# Patient Record
Sex: FEMALE | Race: WHITE | Employment: FULL TIME | URBAN - METROPOLITAN AREA
[De-identification: names, ages, dates, MRNs, and addresses within clinical notes are randomized per-mention and may not be internally consistent; named-entity substitution may affect disease eponyms.]

---

## 2019-02-19 ENCOUNTER — HOSPITAL ENCOUNTER (OUTPATIENT)
Dept: GENERAL RADIOLOGY | Age: 44
Discharge: HOME | End: 2019-02-21
Payer: COMMERCIAL

## 2019-02-19 DIAGNOSIS — Z12.31 VISIT FOR SCREENING MAMMOGRAM: ICD-10-CM

## 2019-02-19 PROCEDURE — 77067 SCR MAMMO BI INCL CAD: CPT

## 2019-03-15 ENCOUNTER — OFFICE VISIT (OUTPATIENT)
Dept: FAMILY MEDICINE CLINIC | Age: 44
End: 2019-03-15
Payer: COMMERCIAL

## 2019-03-15 VITALS
HEART RATE: 75 BPM | HEIGHT: 68 IN | SYSTOLIC BLOOD PRESSURE: 124 MMHG | DIASTOLIC BLOOD PRESSURE: 80 MMHG | OXYGEN SATURATION: 96 % | WEIGHT: 139 LBS | BODY MASS INDEX: 21.07 KG/M2 | RESPIRATION RATE: 16 BRPM

## 2019-03-15 DIAGNOSIS — M25.561 CHRONIC PAIN OF BOTH KNEES: ICD-10-CM

## 2019-03-15 DIAGNOSIS — Z00.00 WELL ADULT EXAM: Primary | ICD-10-CM

## 2019-03-15 DIAGNOSIS — J30.81 CAT ALLERGIES: ICD-10-CM

## 2019-03-15 DIAGNOSIS — M25.562 CHRONIC PAIN OF BOTH KNEES: ICD-10-CM

## 2019-03-15 DIAGNOSIS — G89.29 CHRONIC PAIN OF BOTH KNEES: ICD-10-CM

## 2019-03-15 PROCEDURE — G8484 FLU IMMUNIZE NO ADMIN: HCPCS | Performed by: FAMILY MEDICINE

## 2019-03-15 PROCEDURE — 99396 PREV VISIT EST AGE 40-64: CPT | Performed by: FAMILY MEDICINE

## 2019-03-15 RX ORDER — ALBUTEROL SULFATE 90 UG/1
2 AEROSOL, METERED RESPIRATORY (INHALATION) EVERY 4 HOURS PRN
Qty: 1 INHALER | Refills: 5 | Status: SHIPPED
Start: 2019-03-15 | End: 2022-03-29 | Stop reason: SDUPTHER

## 2019-05-16 NOTE — PROGRESS NOTES
Multiple attempts to schedule patient for breast MRI. Voice mails left for patient, no return calls. Physician office notified.

## 2019-06-20 ENCOUNTER — NURSE ONLY (OUTPATIENT)
Dept: FAMILY MEDICINE CLINIC | Age: 44
End: 2019-06-20
Payer: COMMERCIAL

## 2019-06-20 ENCOUNTER — HOSPITAL ENCOUNTER (OUTPATIENT)
Age: 44
Discharge: HOME | End: 2019-06-22
Payer: COMMERCIAL

## 2019-06-20 DIAGNOSIS — R31.9 HEMATURIA, UNSPECIFIED TYPE: ICD-10-CM

## 2019-06-20 DIAGNOSIS — R30.0 BURNING WITH URINATION: ICD-10-CM

## 2019-06-20 DIAGNOSIS — R30.0 BURNING WITH URINATION: Primary | ICD-10-CM

## 2019-06-20 DIAGNOSIS — R35.0 FREQUENCY OF URINATION: ICD-10-CM

## 2019-06-20 LAB
BILIRUBIN, POC: NORMAL
BLOOD URINE, POC: NORMAL
CLARITY, POC: NORMAL
COLOR, POC: NORMAL
GLUCOSE URINE, POC: NORMAL
KETONES, POC: NORMAL
LEUKOCYTE EST, POC: NORMAL
NITRITE, POC: NORMAL
PH, POC: 7.5
PROTEIN, POC: NORMAL
SPECIFIC GRAVITY, POC: 1.02
UROBILINOGEN, POC: NORMAL

## 2019-06-20 PROCEDURE — 87186 SC STD MICRODIL/AGAR DIL: CPT

## 2019-06-20 PROCEDURE — 81002 URINALYSIS NONAUTO W/O SCOPE: CPT | Performed by: FAMILY MEDICINE

## 2019-06-20 PROCEDURE — 87088 URINE BACTERIA CULTURE: CPT

## 2019-06-20 PROCEDURE — 87077 CULTURE AEROBIC IDENTIFY: CPT

## 2019-06-20 RX ORDER — CIPROFLOXACIN 500 MG/1
500 TABLET, FILM COATED ORAL 2 TIMES DAILY
Qty: 10 TABLET | Refills: 0 | Status: SHIPPED | OUTPATIENT
Start: 2019-06-20 | End: 2019-06-25

## 2019-06-22 LAB
ORGANISM: ABNORMAL
URINE CULTURE, ROUTINE: ABNORMAL
URINE CULTURE, ROUTINE: ABNORMAL

## 2019-10-11 ENCOUNTER — NURSE ONLY (OUTPATIENT)
Dept: FAMILY MEDICINE CLINIC | Age: 44
End: 2019-10-11

## 2019-10-11 ENCOUNTER — HOSPITAL ENCOUNTER (OUTPATIENT)
Age: 44
Discharge: HOME | End: 2019-10-13
Payer: COMMERCIAL

## 2019-10-11 DIAGNOSIS — R30.0 DYSURIA: Primary | ICD-10-CM

## 2019-10-11 DIAGNOSIS — R30.0 DYSURIA: ICD-10-CM

## 2019-10-11 LAB
BILIRUBIN, POC: NORMAL
BLOOD URINE, POC: NORMAL
CLARITY, POC: NORMAL
COLOR, POC: NORMAL
GLUCOSE URINE, POC: NORMAL
KETONES, POC: NORMAL
LEUKOCYTE EST, POC: NORMAL
NITRITE, POC: NORMAL
PH, POC: 6
PROTEIN, POC: NORMAL
SPECIFIC GRAVITY, POC: 1.02
UROBILINOGEN, POC: NORMAL

## 2019-10-11 PROCEDURE — 87088 URINE BACTERIA CULTURE: CPT

## 2019-10-11 PROCEDURE — 81002 URINALYSIS NONAUTO W/O SCOPE: CPT | Performed by: FAMILY MEDICINE

## 2019-10-11 RX ORDER — SULFAMETHOXAZOLE AND TRIMETHOPRIM 800; 160 MG/1; MG/1
1 TABLET ORAL 2 TIMES DAILY
Qty: 10 TABLET | Refills: 0 | Status: SHIPPED | OUTPATIENT
Start: 2019-10-11 | End: 2019-10-16

## 2019-10-14 LAB
ORGANISM: ABNORMAL
URINE CULTURE, ROUTINE: ABNORMAL
URINE CULTURE, ROUTINE: ABNORMAL

## 2019-12-02 LAB
ALBUMIN SERPL-MCNC: NORMAL G/DL
ALP BLD-CCNC: NORMAL U/L
ALT SERPL-CCNC: NORMAL U/L
ANION GAP SERPL CALCULATED.3IONS-SCNC: NORMAL MMOL/L
AST SERPL-CCNC: NORMAL U/L
BILIRUB SERPL-MCNC: NORMAL MG/DL
BUN BLDV-MCNC: 13 MG/DL
CALCIUM SERPL-MCNC: NORMAL MG/DL
CHLORIDE BLD-SCNC: NORMAL MMOL/L
CHOLESTEROL, TOTAL: 196 MG/DL
CHOLESTEROL/HDL RATIO: NORMAL
CO2: NORMAL
CREAT SERPL-MCNC: 0.72 MG/DL
GFR CALCULATED: NORMAL
GLUCOSE BLD-MCNC: 90 MG/DL
HDLC SERPL-MCNC: 69 MG/DL (ref 35–70)
LDL CHOLESTEROL CALCULATED: 112 MG/DL (ref 0–160)
POTASSIUM SERPL-SCNC: 4.5 MMOL/L
SODIUM BLD-SCNC: NORMAL MMOL/L
TOTAL PROTEIN: NORMAL
TRIGL SERPL-MCNC: 76 MG/DL
VLDLC SERPL CALC-MCNC: NORMAL MG/DL

## 2019-12-06 DIAGNOSIS — Z00.00 WELL ADULT EXAM: ICD-10-CM

## 2020-02-27 ENCOUNTER — HOSPITAL ENCOUNTER (OUTPATIENT)
Dept: GENERAL RADIOLOGY | Age: 45
Discharge: HOME | End: 2020-02-29
Payer: COMMERCIAL

## 2020-02-27 PROCEDURE — 77063 BREAST TOMOSYNTHESIS BI: CPT

## 2021-03-05 ENCOUNTER — HOSPITAL ENCOUNTER (OUTPATIENT)
Dept: GENERAL RADIOLOGY | Age: 46
Discharge: HOME | End: 2021-03-07
Payer: COMMERCIAL

## 2021-03-05 DIAGNOSIS — Z12.31 VISIT FOR SCREENING MAMMOGRAM: ICD-10-CM

## 2021-03-05 PROCEDURE — 77063 BREAST TOMOSYNTHESIS BI: CPT

## 2021-03-24 ENCOUNTER — OFFICE VISIT (OUTPATIENT)
Dept: FAMILY MEDICINE CLINIC | Age: 46
End: 2021-03-24
Payer: COMMERCIAL

## 2021-03-24 VITALS
SYSTOLIC BLOOD PRESSURE: 102 MMHG | HEIGHT: 68 IN | OXYGEN SATURATION: 99 % | BODY MASS INDEX: 22.34 KG/M2 | RESPIRATION RATE: 16 BRPM | DIASTOLIC BLOOD PRESSURE: 64 MMHG | WEIGHT: 147.4 LBS | HEART RATE: 78 BPM | TEMPERATURE: 98.4 F

## 2021-03-24 DIAGNOSIS — Z76.89 ENCOUNTER TO ESTABLISH CARE: ICD-10-CM

## 2021-03-24 DIAGNOSIS — E04.1 THYROID NODULE: ICD-10-CM

## 2021-03-24 DIAGNOSIS — Z23 NEED FOR VACCINATION: ICD-10-CM

## 2021-03-24 DIAGNOSIS — J45.20 MILD INTERMITTENT ASTHMA WITHOUT COMPLICATION: Primary | ICD-10-CM

## 2021-03-24 PROCEDURE — G8420 CALC BMI NORM PARAMETERS: HCPCS | Performed by: FAMILY MEDICINE

## 2021-03-24 PROCEDURE — G8427 DOCREV CUR MEDS BY ELIG CLIN: HCPCS | Performed by: FAMILY MEDICINE

## 2021-03-24 PROCEDURE — G8484 FLU IMMUNIZE NO ADMIN: HCPCS | Performed by: FAMILY MEDICINE

## 2021-03-24 PROCEDURE — 1036F TOBACCO NON-USER: CPT | Performed by: FAMILY MEDICINE

## 2021-03-24 PROCEDURE — 90715 TDAP VACCINE 7 YRS/> IM: CPT | Performed by: FAMILY MEDICINE

## 2021-03-24 PROCEDURE — 90471 IMMUNIZATION ADMIN: CPT | Performed by: FAMILY MEDICINE

## 2021-03-24 PROCEDURE — 99213 OFFICE O/P EST LOW 20 MIN: CPT | Performed by: FAMILY MEDICINE

## 2021-03-24 SDOH — ECONOMIC STABILITY: TRANSPORTATION INSECURITY
IN THE PAST 12 MONTHS, HAS THE LACK OF TRANSPORTATION KEPT YOU FROM MEDICAL APPOINTMENTS OR FROM GETTING MEDICATIONS?: NO

## 2021-03-24 SDOH — ECONOMIC STABILITY: INCOME INSECURITY: HOW HARD IS IT FOR YOU TO PAY FOR THE VERY BASICS LIKE FOOD, HOUSING, MEDICAL CARE, AND HEATING?: NOT HARD AT ALL

## 2021-03-24 ASSESSMENT — PATIENT HEALTH QUESTIONNAIRE - PHQ9
SUM OF ALL RESPONSES TO PHQ QUESTIONS 1-9: 0
SUM OF ALL RESPONSES TO PHQ QUESTIONS 1-9: 0

## 2021-03-24 NOTE — PROGRESS NOTES
CC: Griselda Churchman is a 51 y.o. yo female here for evaluation of the following medical concerns: New Patient      HPI:    Establish care    Mild intermittent asthma - exacerbated by animal dander; uses albuterol PRN  Benign thryoid nodule in the past - US done at [facility]; no records on this    ROS negative unless otherwise noted    Vitals:  /64   Pulse 78   Temp 98.4 °F (36.9 °C)   Resp 16   Ht 5' 8\" (1.727 m)   Wt 147 lb 6.4 oz (66.9 kg)   SpO2 99%   BMI 22.41 kg/m²   Wt Readings from Last 3 Encounters:   03/24/21 147 lb 6.4 oz (66.9 kg)   03/15/19 139 lb (63 kg)   05/01/15 134 lb 3.2 oz (60.9 kg)       Physical Exam:  Constitutional - alert, well appearing, and in no distress  Eyes - extraocular eye movements intact, left eye normal, right eye normal, no conjunctivitis noted  Neck - supple, no significant adenopathy; thyroid exam: thyroid is normal in size without nodules or tenderness  Respiratory- clear to auscultation, no wheezes, rales or rhonchi, symmetric air entry; no increased work of breathing  Cardiovascular - normal rate, regular rhythm, normal S1, S2, no murmurs, rubs, clicks or gallops; Extremities - no edema noted  Abdomen - soft, nontender, nondistended  Musculoskeletal - gait normal; no clubbing, cyanosis of extremities noted; no joint deformity or swelling noted  Skin - normal coloration and turgor, no rashes, no suspicious skin lesions noted  Neurological - alert, oriented; no obvious CN deficits, normal speech, no obvious focal findings noted  Psychiatric - normal mood, behavior, speech, dress    Assessment / Plan   Diagnosis Orders   1. Mild intermittent asthma without complication     2. Encounter to establish care     3. Thyroid nodule     4. Need for vaccination  Tdap (age 6y and older) IM (Aluwave Extension)       She will call for labs in 1 year prior to our visit  Medical records as able    RTO: Return in about 1 year (around 3/24/2022) for Annual Preventive.       An electronic signature was used to authenticate this note.  ---- Rolan Hilliard MD on 3/24/2021 at 3:19 PM

## 2022-03-15 ENCOUNTER — TELEPHONE (OUTPATIENT)
Dept: FAMILY MEDICINE CLINIC | Age: 47
End: 2022-03-15

## 2022-03-15 DIAGNOSIS — E55.9 VITAMIN D DEFICIENCY, UNSPECIFIED: ICD-10-CM

## 2022-03-15 DIAGNOSIS — Z00.00 HEALTHCARE MAINTENANCE: Primary | ICD-10-CM

## 2022-03-15 DIAGNOSIS — E78.5 HYPERLIPIDEMIA, UNSPECIFIED HYPERLIPIDEMIA TYPE: ICD-10-CM

## 2022-03-15 DIAGNOSIS — Z00.00 ENCOUNTER FOR PREVENTIVE CARE: ICD-10-CM

## 2022-03-15 NOTE — TELEPHONE ENCOUNTER
----- Message from Patricia Mcgill sent at 3/14/2022  4:47 PM EDT -----  Subject: Message to Provider    QUESTIONS  Information for Provider? The patient called in to order lab work for an   upcoming appointment there are none on file so she is requesting a call   back on how to proceed if still needed. ---------------------------------------------------------------------------  --------------  Giuliana PICHARDO  What is the best way for the office to contact you? OK to leave message on   voicemail  Preferred Call Back Phone Number? [phone]  ---------------------------------------------------------------------------  --------------  SCRIPT ANSWERS  Relationship to Patient?  Self

## 2022-03-18 ENCOUNTER — HOSPITAL ENCOUNTER (OUTPATIENT)
Dept: GENERAL RADIOLOGY | Age: 47
Discharge: HOME | End: 2022-03-20
Payer: COMMERCIAL

## 2022-03-18 VITALS — WEIGHT: 135 LBS | BODY MASS INDEX: 20.46 KG/M2 | HEIGHT: 68 IN

## 2022-03-18 DIAGNOSIS — Z12.31 VISIT FOR SCREENING MAMMOGRAM: ICD-10-CM

## 2022-03-18 PROCEDURE — 77063 BREAST TOMOSYNTHESIS BI: CPT

## 2022-03-23 LAB
ANTIBODY: NORMAL
HIV AG/AB: NEGATIVE
HIV AG/AB: NORMAL

## 2022-03-29 ENCOUNTER — OFFICE VISIT (OUTPATIENT)
Dept: FAMILY MEDICINE CLINIC | Age: 47
End: 2022-03-29
Payer: COMMERCIAL

## 2022-03-29 VITALS
DIASTOLIC BLOOD PRESSURE: 62 MMHG | SYSTOLIC BLOOD PRESSURE: 98 MMHG | OXYGEN SATURATION: 98 % | HEIGHT: 69 IN | WEIGHT: 150 LBS | BODY MASS INDEX: 22.22 KG/M2 | HEART RATE: 80 BPM | TEMPERATURE: 97.4 F

## 2022-03-29 DIAGNOSIS — E55.9 VITAMIN D DEFICIENCY, UNSPECIFIED: ICD-10-CM

## 2022-03-29 DIAGNOSIS — R73.9 ELEVATED BLOOD SUGAR: ICD-10-CM

## 2022-03-29 DIAGNOSIS — E78.5 HYPERLIPIDEMIA, UNSPECIFIED HYPERLIPIDEMIA TYPE: ICD-10-CM

## 2022-03-29 DIAGNOSIS — Z12.11 SCREEN FOR COLON CANCER: ICD-10-CM

## 2022-03-29 DIAGNOSIS — Z00.00 ENCOUNTER FOR PREVENTIVE CARE: Primary | ICD-10-CM

## 2022-03-29 DIAGNOSIS — Z91.030 ALLERGY TO HONEY BEE VENOM: ICD-10-CM

## 2022-03-29 DIAGNOSIS — D72.819 LEUKOPENIA, UNSPECIFIED TYPE: ICD-10-CM

## 2022-03-29 DIAGNOSIS — R79.89 ELEVATED TSH: ICD-10-CM

## 2022-03-29 DIAGNOSIS — J30.81 CAT ALLERGIES: ICD-10-CM

## 2022-03-29 LAB — HBA1C MFR BLD: 5.4 %

## 2022-03-29 PROCEDURE — 83036 HEMOGLOBIN GLYCOSYLATED A1C: CPT | Performed by: FAMILY MEDICINE

## 2022-03-29 PROCEDURE — G8482 FLU IMMUNIZE ORDER/ADMIN: HCPCS | Performed by: FAMILY MEDICINE

## 2022-03-29 PROCEDURE — 99396 PREV VISIT EST AGE 40-64: CPT | Performed by: FAMILY MEDICINE

## 2022-03-29 RX ORDER — ERGOCALCIFEROL 1.25 MG/1
50000 CAPSULE ORAL WEEKLY
Qty: 12 CAPSULE | Refills: 1 | Status: SHIPPED | OUTPATIENT
Start: 2022-03-29

## 2022-03-29 RX ORDER — EPINEPHRINE 0.3 MG/.3ML
0.3 INJECTION SUBCUTANEOUS ONCE
Qty: 0.3 ML | Refills: 1 | Status: SHIPPED | OUTPATIENT
Start: 2022-03-29 | End: 2022-03-29

## 2022-03-29 RX ORDER — ALBUTEROL SULFATE 90 UG/1
2 AEROSOL, METERED RESPIRATORY (INHALATION) EVERY 4 HOURS PRN
Qty: 3 EACH | Refills: 1 | Status: SHIPPED | OUTPATIENT
Start: 2022-03-29

## 2022-03-29 SDOH — ECONOMIC STABILITY: FOOD INSECURITY: WITHIN THE PAST 12 MONTHS, YOU WORRIED THAT YOUR FOOD WOULD RUN OUT BEFORE YOU GOT MONEY TO BUY MORE.: NEVER TRUE

## 2022-03-29 SDOH — ECONOMIC STABILITY: FOOD INSECURITY: WITHIN THE PAST 12 MONTHS, THE FOOD YOU BOUGHT JUST DIDN'T LAST AND YOU DIDN'T HAVE MONEY TO GET MORE.: NEVER TRUE

## 2022-03-29 ASSESSMENT — PATIENT HEALTH QUESTIONNAIRE - PHQ9
SUM OF ALL RESPONSES TO PHQ QUESTIONS 1-9: 0
2. FEELING DOWN, DEPRESSED OR HOPELESS: 0
SUM OF ALL RESPONSES TO PHQ QUESTIONS 1-9: 0
1. LITTLE INTEREST OR PLEASURE IN DOING THINGS: 0
SUM OF ALL RESPONSES TO PHQ9 QUESTIONS 1 & 2: 0

## 2022-03-29 ASSESSMENT — SOCIAL DETERMINANTS OF HEALTH (SDOH): HOW HARD IS IT FOR YOU TO PAY FOR THE VERY BASICS LIKE FOOD, HOUSING, MEDICAL CARE, AND HEATING?: NOT HARD AT ALL

## 2022-03-29 NOTE — PROGRESS NOTES
CC: Cameron Gay is a 49 y.o. yo female here for evaluation of the following medical concerns: Annual Exam and Discuss Labs      HPI:      Mild intermittent asthma - exacerbated by animal dander; uses albuterol PRN  Benign thryoid nodule in the past - US done at [facility]; no records on this  Preventive: Patient has a Tyrer-Cuzick score of: 21.7%; Scattered fibroglandular density; obtains mammo / pap through [facility]  Mildly elevated TSH: no obvious symptoms  Mild leukopenia  Concern for bee venom allergy; worsening reaction / swelling after bee stings; at this time no anaphylaxis but has been getting worsening reaction with subsequent stings    Adult Screening:   Blood pressure normotensive: Yes    Obesity (BMI 30+): No   Diabetes screen: NA    Statin for CVD events and mortality preventive medication: see labs   Aspirin for CVD and colon cancer preventive medication: see labs   Diet to prevent CV disease (adults with cardiovascular risk factors): NA; Patient's cardiovascular risk history includes: none   Alcohol use:   reports current alcohol use.  Tobacco abuse:   reports that she has never smoked. She has never used smokeless tobacco.  Depression screening: PHQ-9 Total Score: 0 (3/29/2022  3:06 PM)  Ariella Araujo    Annual lung cancer screening: NA    Colorectal Cancer Screening, average risk (50-75yrs): NA    Hx of CRC before age 57yrs in FDR: NA    Hep C virus infection screening: NA    Fall prevention: NA     Sexually active specific:    Sexual activity: single partner, contraception - NA; High risk behavior: none,  Sexually transmitted infections counseling: no    Female specific:   Folate supplementation (reproductive age): NA    Intimate partner violence concerns (reproductive age): safe    Cervical Cancer Screening (21 - 72 years): UTD with Pedro Bob;  Hx abnormal PAP: no   BRCA risk assessment and genetic counseling/testing: no personal or family hx of breast, ovarian, tubal or peritoneal ca or ancestry associated with BRCA1/2 gene mutations    Breast Cancer Screening: NA    Osteoporosis screening in postmenopausal women younger than 72 years at increased risk of osteoporosis / 10-year FRAX risk of major osteoporotic fracture (MOF) (without DXA) greater than that of a 77-year-old white woman without major risk factors (8.4%): NA    Osteoporosis screening: women 72 years and older: NA        Health Maintenance Due   Topic Date Due    Pneumococcal 0-64 years Vaccine (1 of 2 - PPSV23) Never done    HIV screen  Never done    Colorectal Cancer Screen  Never done    Depression Screen  2022     Immunization History   Administered Date(s) Administered    COVID-19, [place]ine [place], Primary or Immunocompromised, PF, 100mcg/0.5mL 2021, 2021, 2021    Influenza, Quadv, IM, PF (6 mo and older Fluzone, Flulaval, Fluarix, and 3 yrs and older Afluria) 10/12/2021    Tdap (Boostrix, Adacel) 2021       Past Medical History:   Diagnosis Date    Allergic rhinitis     Thyroid nodule      Past Surgical History:   Procedure Laterality Date     SECTION       Family History   Problem Relation Age of Onset    Heart Disease Father 79    High Blood Pressure Father     Other Brother         bladder cancer    Breast Cancer Paternal Aunt 54     Social History     Tobacco Use    Smoking status: Never Smoker    Smokeless tobacco: Never Used   Substance Use Topics    Alcohol use: Yes     Comment: occas.  Drug use: No     Allergies   Allergen Reactions    Bee Venom      Prior to Admission medications    Medication Sig Start Date End Date Taking?  Authorizing Provider   albuterol sulfate  (90 Base) MCG/ACT inhaler Inhale 2 puffs into the lungs every 4 hours as needed for Wheezing 3/29/22  Yes Marisol Suresh MD   vitamin D (ERGOCALCIFEROL) 1.25 MG (64848 UT) CAPS capsule Take 1 capsule by mouth once a week 3/29/22  Yes Marisol Suresh MD   EPINEPHrine (Georgeana Primas 2-SHARON) 0.3 MG/0.3ML SOAJ injection Inject 0.3 mLs into the muscle once for 1 dose Use as directed for allergic reaction 3/29/22 3/29/22 Yes Alexey Collins MD   Multiple Vitamins-Minerals (THERAPEUTIC MULTIVITAMIN-MINERALS) tablet Take 1 tablet by mouth daily. Yes Historical Provider, MD     Vitals:  BP 98/62   Pulse 80   Temp 97.4 °F (36.3 °C) (Temporal)   Ht 5' 8.5\" (1.74 m)   Wt 150 lb (68 kg)   LMP  (LMP Unknown)   SpO2 98%   BMI 22.48 kg/m²   Wt Readings from Last 3 Encounters:   03/29/22 150 lb (68 kg)   03/18/22 135 lb (61.2 kg)   03/24/21 147 lb 6.4 oz (66.9 kg)       Physical Exam:  Constitutional - alert, well appearing, and in no distress  Eyes - extraocular eye movements intact, left eye normal, right eye normal, no conjunctivitis noted  Neck - supple, no significant adenopathy; thyroid exam: thyroid is normal in size without nodules or tenderness  Respiratory- clear to auscultation, no wheezes, rales or rhonchi, symmetric air entry; no increased work of breathing  Cardiovascular - normal rate, regular rhythm, normal S1, S2, no murmurs, rubs, clicks or gallops; Extremities - no edema noted  Abdomen - soft, nontender, nondistended  Musculoskeletal - no clubbing, cyanosis of extremities noted; no joint deformity or swelling noted  Skin - normal coloration and turgor, no rashes, no suspicious skin lesions noted  Neurological - alert, oriented; no obvious CN deficits, normal speech, no obvious focal findings noted  Psychiatric - normal mood, behavior, speech, dress    Labs:  Pertinent labs, imaging, other diagnostic data and other clinician documentation reviewed in electronic medical record. Assessment / Plan   Diagnosis Orders   1. Encounter for preventive care     2. Hyperlipidemia, unspecified hyperlipidemia type     3. Elevated TSH  TSH   4. Leukopenia, unspecified type  CBC with Auto Differential   5. Cat allergies  albuterol sulfate  (90 Base) MCG/ACT inhaler   6.  Elevated blood sugar  POCT glycosylated hemoglobin (Hb A1C)   7. Vitamin D deficiency, unspecified  vitamin D (ERGOCALCIFEROL) 1.25 MG (85886 UT) CAPS capsule   8. Allergy to honey bee venom  EPINEPHrine (EPIPEN 2-SHARON) 0.3 MG/0.3ML SOAJ injection   9. Screen for colon cancer  Fecal DNA Colorectal cancer screening (Cologuard)       Repeat TSH and CBC in 3 months  Carry Epipen and go to ED if noticing symptoms after bee sting  Call for new or worsening symptoms  HM as ordered      RTO: Return in about 1 year (around 3/29/2023) for Annual Preventive.       An electronic signature was used to authenticate this note.  ---- Robert Mendes MD on 3/29/2022 at 4:23 PM

## 2022-03-31 DIAGNOSIS — Z00.00 HEALTHCARE MAINTENANCE: ICD-10-CM

## 2022-03-31 DIAGNOSIS — Z00.00 ENCOUNTER FOR PREVENTIVE CARE: ICD-10-CM

## 2022-04-19 LAB — NONINV COLON CA DNA+OCC BLD SCRN STL QL: NEGATIVE

## 2022-08-09 DIAGNOSIS — D72.819 LEUKOPENIA, UNSPECIFIED TYPE: ICD-10-CM

## 2022-08-09 DIAGNOSIS — R79.89 ELEVATED TSH: ICD-10-CM

## 2022-08-09 LAB
BASOPHILS ABSOLUTE: 0.07 E9/L (ref 0–0.2)
BASOPHILS RELATIVE PERCENT: 1.4 % (ref 0–2)
EOSINOPHILS ABSOLUTE: 0.23 E9/L (ref 0.05–0.5)
EOSINOPHILS RELATIVE PERCENT: 4.7 % (ref 0–6)
HCT VFR BLD CALC: 38.2 % (ref 34–48)
HEMOGLOBIN: 12.5 G/DL (ref 11.5–15.5)
IMMATURE GRANULOCYTES #: 0 E9/L
IMMATURE GRANULOCYTES %: 0 % (ref 0–5)
LYMPHOCYTES ABSOLUTE: 1.65 E9/L (ref 1.5–4)
LYMPHOCYTES RELATIVE PERCENT: 33.5 % (ref 20–42)
MCH RBC QN AUTO: 30.1 PG (ref 26–35)
MCHC RBC AUTO-ENTMCNC: 32.7 % (ref 32–34.5)
MCV RBC AUTO: 92 FL (ref 80–99.9)
MONOCYTES ABSOLUTE: 0.47 E9/L (ref 0.1–0.95)
MONOCYTES RELATIVE PERCENT: 9.6 % (ref 2–12)
NEUTROPHILS ABSOLUTE: 2.5 E9/L (ref 1.8–7.3)
NEUTROPHILS RELATIVE PERCENT: 50.8 % (ref 43–80)
PDW BLD-RTO: 13.2 FL (ref 11.5–15)
PLATELET # BLD: 252 E9/L (ref 130–450)
PMV BLD AUTO: 11.1 FL (ref 7–12)
RBC # BLD: 4.15 E12/L (ref 3.5–5.5)
TSH SERPL DL<=0.05 MIU/L-ACNC: 4.27 UIU/ML (ref 0.27–4.2)
WBC # BLD: 4.9 E9/L (ref 4.5–11.5)

## 2023-03-24 ENCOUNTER — HOSPITAL ENCOUNTER (OUTPATIENT)
Dept: GENERAL RADIOLOGY | Age: 48
Discharge: HOME | End: 2023-03-24
Payer: COMMERCIAL

## 2023-03-24 DIAGNOSIS — Z12.31 ENCOUNTER FOR SCREENING MAMMOGRAM FOR MALIGNANT NEOPLASM OF BREAST: ICD-10-CM

## 2023-03-24 PROCEDURE — 77063 BREAST TOMOSYNTHESIS BI: CPT

## 2023-05-29 ENCOUNTER — PATIENT MESSAGE (OUTPATIENT)
Dept: FAMILY MEDICINE CLINIC | Age: 48
End: 2023-05-29

## 2023-05-29 DIAGNOSIS — R73.9 ELEVATED BLOOD SUGAR: ICD-10-CM

## 2023-05-29 DIAGNOSIS — E78.5 HYPERLIPIDEMIA, UNSPECIFIED HYPERLIPIDEMIA TYPE: Primary | ICD-10-CM

## 2023-05-29 DIAGNOSIS — R79.89 ELEVATED TSH: ICD-10-CM

## 2023-05-29 DIAGNOSIS — E55.9 VITAMIN D DEFICIENCY, UNSPECIFIED: ICD-10-CM

## 2023-05-30 ASSESSMENT — PATIENT HEALTH QUESTIONNAIRE - PHQ9
SUM OF ALL RESPONSES TO PHQ QUESTIONS 1-9: 0
2. FEELING DOWN, DEPRESSED OR HOPELESS: NOT AT ALL
2. FEELING DOWN, DEPRESSED OR HOPELESS: 0
SUM OF ALL RESPONSES TO PHQ QUESTIONS 1-9: 0
1. LITTLE INTEREST OR PLEASURE IN DOING THINGS: 0
SUM OF ALL RESPONSES TO PHQ9 QUESTIONS 1 & 2: 0
SUM OF ALL RESPONSES TO PHQ QUESTIONS 1-9: 0
SUM OF ALL RESPONSES TO PHQ9 QUESTIONS 1 & 2: 0
SUM OF ALL RESPONSES TO PHQ QUESTIONS 1-9: 0
1. LITTLE INTEREST OR PLEASURE IN DOING THINGS: NOT AT ALL

## 2023-05-30 NOTE — TELEPHONE ENCOUNTER
From: Juan Zambrano  To: Dr. Chun Navarro: 5/29/2023 10:51 PM EDT  Subject: Orders for blood work    Hello, I have a physical scheduled this week and was asked to get labs done prior to the appointment. Would you please place the orders?  Thank you, Juan Zambrano

## 2023-06-02 ENCOUNTER — OFFICE VISIT (OUTPATIENT)
Dept: FAMILY MEDICINE CLINIC | Age: 48
End: 2023-06-02
Payer: COMMERCIAL

## 2023-06-02 VITALS
BODY MASS INDEX: 23.31 KG/M2 | DIASTOLIC BLOOD PRESSURE: 66 MMHG | HEART RATE: 75 BPM | OXYGEN SATURATION: 100 % | TEMPERATURE: 97.4 F | SYSTOLIC BLOOD PRESSURE: 112 MMHG | WEIGHT: 153.8 LBS | HEIGHT: 68 IN

## 2023-06-02 DIAGNOSIS — R53.83 OTHER FATIGUE: ICD-10-CM

## 2023-06-02 DIAGNOSIS — J30.81 CAT ALLERGIES: ICD-10-CM

## 2023-06-02 DIAGNOSIS — Z00.00 ENCOUNTER FOR PREVENTIVE CARE: Primary | ICD-10-CM

## 2023-06-02 DIAGNOSIS — Z23 NEED FOR VACCINATION: ICD-10-CM

## 2023-06-02 DIAGNOSIS — Z91.89 AT HIGH RISK FOR BREAST CANCER: ICD-10-CM

## 2023-06-02 DIAGNOSIS — E55.9 VITAMIN D DEFICIENCY, UNSPECIFIED: ICD-10-CM

## 2023-06-02 PROCEDURE — 99396 PREV VISIT EST AGE 40-64: CPT | Performed by: FAMILY MEDICINE

## 2023-06-02 PROCEDURE — 90471 IMMUNIZATION ADMIN: CPT | Performed by: FAMILY MEDICINE

## 2023-06-02 PROCEDURE — 90677 PCV20 VACCINE IM: CPT | Performed by: FAMILY MEDICINE

## 2023-06-02 RX ORDER — ALBUTEROL SULFATE 90 UG/1
2 AEROSOL, METERED RESPIRATORY (INHALATION) EVERY 4 HOURS PRN
Qty: 3 EACH | Refills: 1 | Status: SHIPPED | OUTPATIENT
Start: 2023-06-02

## 2023-06-02 SDOH — ECONOMIC STABILITY: HOUSING INSECURITY
IN THE LAST 12 MONTHS, WAS THERE A TIME WHEN YOU DID NOT HAVE A STEADY PLACE TO SLEEP OR SLEPT IN A SHELTER (INCLUDING NOW)?: NO

## 2023-06-02 SDOH — ECONOMIC STABILITY: INCOME INSECURITY: HOW HARD IS IT FOR YOU TO PAY FOR THE VERY BASICS LIKE FOOD, HOUSING, MEDICAL CARE, AND HEATING?: NOT HARD AT ALL

## 2023-06-02 SDOH — ECONOMIC STABILITY: FOOD INSECURITY: WITHIN THE PAST 12 MONTHS, YOU WORRIED THAT YOUR FOOD WOULD RUN OUT BEFORE YOU GOT MONEY TO BUY MORE.: NEVER TRUE

## 2023-06-02 SDOH — ECONOMIC STABILITY: FOOD INSECURITY: WITHIN THE PAST 12 MONTHS, THE FOOD YOU BOUGHT JUST DIDN'T LAST AND YOU DIDN'T HAVE MONEY TO GET MORE.: NEVER TRUE

## 2023-06-02 NOTE — PROGRESS NOTES
CC: Joaquim Cabrera is a 51 y.o. yo female here for evaluation of the following medical concerns: Annual Exam (Physical)      HPI:    Mildly elevated TSH:  losing more hair; +gained wegiht; more fatigued not sure if related to sress      Mild intermittent asthma - exacerbated by animal dander; uses albuterol PRN  Benign thryoid nodule in the past - US done at [facility]; no records on this  Preventive: Patient has a Tyrer-Cuzick score of: 21.7%; Scattered fibroglandular density; obtains mammo / pap through [facility]; Dr. Melia Sinha  Mild leukopenia, resolved on last labs  bee venom allergy; worsening reaction / swelling after bee stings; at this time no anaphylaxis but has been getting worsening reaction with subsequent stings -- same    Adult Screening:  Blood pressure normotensive: Yes   Obesity (BMI 30+): No  Diabetes screen: NA   Statin for CVD events and mortality preventive medication: see labs  Aspirin for CVD and colon cancer preventive medication: see labs  Diet to prevent CV disease (adults with cardiovascular risk factors): NA; Patient's cardiovascular risk history includes: none  Alcohol use:   reports current alcohol use. Tobacco abuse:   reports that she has never smoked. She has never used smokeless tobacco.  Depression screening: PHQ-9 Total Score: 0 (5/30/2023  8:10 AM)  Annual lung cancer screening: NA   Colorectal Cancer Screening, average risk (45-75yrs): NA   Hx of CRC before age 57yrs in FDR: NA   Hep C virus infection screening: NA   Fall prevention: NA     Sexually active specific:   Sexual activity: single partner, contraception - NA; High risk behavior: none,  Sexually transmitted infections counseling: no    Female specific:  Folate supplementation (reproductive age): NA   Intimate partner violence concerns (reproductive age): safe   Cervical Cancer Screening (21 - 72 years): UTD with Gurvinder Trammell;  Hx abnormal PAP: no  BRCA risk assessment and genetic counseling/testing: see TC

## 2023-06-05 ENCOUNTER — TELEPHONE (OUTPATIENT)
Dept: BREAST CENTER | Age: 48
End: 2023-06-05

## 2023-06-05 NOTE — TELEPHONE ENCOUNTER
Patient is a new referral to the breast clinic for a high risk evaluation. Spoke to patient but she was unable to schedule at the moment. She will call us back later to schedule an appointment. She recently had a mammogram at [facility] in March 2023 with negative results. If patient does not have any breast related concerns we can schedule her as a NEW PATIENT for a high risk evaluation. Imaging [facility] in March 2023. Referred by Dr. Yael Mckenzie.

## 2023-06-06 DIAGNOSIS — R53.83 OTHER FATIGUE: ICD-10-CM

## 2023-06-06 DIAGNOSIS — R73.9 ELEVATED BLOOD SUGAR: ICD-10-CM

## 2023-06-06 DIAGNOSIS — E55.9 VITAMIN D DEFICIENCY, UNSPECIFIED: ICD-10-CM

## 2023-06-06 DIAGNOSIS — Z00.00 ENCOUNTER FOR PREVENTIVE CARE: ICD-10-CM

## 2023-06-06 LAB
ALBUMIN SERPL-MCNC: 4.4 G/DL (ref 3.5–5.2)
ALP SERPL-CCNC: 97 U/L (ref 35–104)
ALT SERPL-CCNC: 15 U/L (ref 0–32)
ANION GAP SERPL CALCULATED.3IONS-SCNC: 13 MMOL/L (ref 7–16)
AST SERPL-CCNC: 23 U/L (ref 0–31)
BASOPHILS # BLD: 0.07 E9/L (ref 0–0.2)
BASOPHILS NFR BLD: 2 % (ref 0–2)
BILIRUB SERPL-MCNC: 0.5 MG/DL (ref 0–1.2)
BILIRUB UR QL STRIP: NEGATIVE
BUN SERPL-MCNC: 10 MG/DL (ref 6–20)
CALCIUM SERPL-MCNC: 9.2 MG/DL (ref 8.6–10.2)
CHLORIDE SERPL-SCNC: 100 MMOL/L (ref 98–107)
CHOLESTEROL, TOTAL: 212 MG/DL (ref 0–199)
CLARITY UR: CLEAR
CO2 SERPL-SCNC: 23 MMOL/L (ref 22–29)
COLOR UR: YELLOW
CREAT SERPL-MCNC: 0.7 MG/DL (ref 0.5–1)
EOSINOPHIL # BLD: 0.26 E9/L (ref 0.05–0.5)
EOSINOPHIL NFR BLD: 7.3 % (ref 0–6)
ERYTHROCYTE [DISTWIDTH] IN BLOOD BY AUTOMATED COUNT: 13.8 FL (ref 11.5–15)
GLUCOSE SERPL-MCNC: 94 MG/DL (ref 74–99)
GLUCOSE UR STRIP-MCNC: NEGATIVE MG/DL
HBA1C MFR BLD: 5.6 % (ref 4–5.6)
HCT VFR BLD AUTO: 39 % (ref 34–48)
HDLC SERPL-MCNC: 61 MG/DL
HGB BLD-MCNC: 12.5 G/DL (ref 11.5–15.5)
HGB UR QL STRIP: NEGATIVE
IMM GRANULOCYTES # BLD: 0.01 E9/L
IMM GRANULOCYTES NFR BLD: 0.3 % (ref 0–5)
KETONES UR STRIP-MCNC: NEGATIVE MG/DL
LDLC SERPL CALC-MCNC: 137 MG/DL (ref 0–99)
LEUKOCYTE ESTERASE UR QL STRIP: NEGATIVE
LYMPHOCYTES # BLD: 1.09 E9/L (ref 1.5–4)
LYMPHOCYTES NFR BLD: 30.5 % (ref 20–42)
MCH RBC QN AUTO: 30.1 PG (ref 26–35)
MCHC RBC AUTO-ENTMCNC: 32.1 % (ref 32–34.5)
MCV RBC AUTO: 94 FL (ref 80–99.9)
MONOCYTES # BLD: 0.38 E9/L (ref 0.1–0.95)
MONOCYTES NFR BLD: 10.6 % (ref 2–12)
NEUTROPHILS # BLD: 1.76 E9/L (ref 1.8–7.3)
NEUTS SEG NFR BLD: 49.3 % (ref 43–80)
NITRITE UR QL STRIP: NEGATIVE
PH UR STRIP: 7 [PH] (ref 5–9)
PLATELET # BLD AUTO: 233 E9/L (ref 130–450)
PMV BLD AUTO: 10.9 FL (ref 7–12)
POTASSIUM SERPL-SCNC: 4.3 MMOL/L (ref 3.5–5)
PROT SERPL-MCNC: 7.3 G/DL (ref 6.4–8.3)
PROT UR STRIP-MCNC: NEGATIVE MG/DL
RBC # BLD AUTO: 4.15 E12/L (ref 3.5–5.5)
SODIUM SERPL-SCNC: 136 MMOL/L (ref 132–146)
SP GR UR STRIP: <=1.005 (ref 1–1.03)
T4 FREE SERPL-MCNC: 1.01 NG/DL (ref 0.93–1.7)
TRIGL SERPL-MCNC: 70 MG/DL (ref 0–149)
TSH SERPL-MCNC: 7.06 UIU/ML (ref 0.27–4.2)
UROBILINOGEN UR STRIP-ACNC: 0.2 E.U./DL
VITAMIN D 25-HYDROXY: 23 NG/ML (ref 30–100)
VLDLC SERPL CALC-MCNC: 14 MG/DL
WBC # BLD: 3.6 E9/L (ref 4.5–11.5)

## 2023-06-07 DIAGNOSIS — D72.819 LEUKOPENIA, UNSPECIFIED TYPE: Primary | ICD-10-CM

## 2023-06-07 DIAGNOSIS — E55.9 VITAMIN D DEFICIENCY, UNSPECIFIED: Primary | ICD-10-CM

## 2023-06-07 RX ORDER — ERGOCALCIFEROL 1.25 MG/1
50000 CAPSULE ORAL WEEKLY
Qty: 12 CAPSULE | Refills: 2 | Status: SHIPPED | OUTPATIENT
Start: 2023-06-07

## 2023-06-09 DIAGNOSIS — E03.9 ACQUIRED HYPOTHYROIDISM: Primary | ICD-10-CM

## 2023-06-09 RX ORDER — LEVOTHYROXINE SODIUM 0.03 MG/1
25 TABLET ORAL DAILY
Qty: 30 TABLET | Refills: 3 | Status: SHIPPED | OUTPATIENT
Start: 2023-06-09

## 2023-06-10 LAB
THYROGLOB IGG SER-ACNC: <12 IU/ML (ref 0–40)
THYROPEROXIDASE IGG SERPL-ACNC: 64 IU/ML (ref 0–25)

## 2023-07-01 DIAGNOSIS — E03.9 ACQUIRED HYPOTHYROIDISM: ICD-10-CM

## 2023-07-03 RX ORDER — LEVOTHYROXINE SODIUM 0.03 MG/1
TABLET ORAL
Qty: 30 TABLET | Refills: 3 | OUTPATIENT
Start: 2023-07-03

## 2023-08-02 ENCOUNTER — OFFICE VISIT (OUTPATIENT)
Dept: BREAST CENTER | Age: 48
End: 2023-08-02
Payer: COMMERCIAL

## 2023-08-02 VITALS
HEART RATE: 78 BPM | TEMPERATURE: 98.1 F | WEIGHT: 156 LBS | BODY MASS INDEX: 23.64 KG/M2 | SYSTOLIC BLOOD PRESSURE: 102 MMHG | OXYGEN SATURATION: 98 % | DIASTOLIC BLOOD PRESSURE: 64 MMHG | RESPIRATION RATE: 16 BRPM | HEIGHT: 68 IN

## 2023-08-02 DIAGNOSIS — R92.2 DENSE BREAST TISSUE ON MAMMOGRAM: ICD-10-CM

## 2023-08-02 DIAGNOSIS — Z01.818 PRE-OP TESTING: ICD-10-CM

## 2023-08-02 DIAGNOSIS — R92.2 DENSE BREAST: ICD-10-CM

## 2023-08-02 DIAGNOSIS — Z80.3 FAMILY HISTORY OF MALIGNANT NEOPLASM OF FEMALE BREAST: Primary | ICD-10-CM

## 2023-08-02 DIAGNOSIS — Z91.89 AT HIGH RISK FOR BREAST CANCER: ICD-10-CM

## 2023-08-02 PROBLEM — R92.30 DENSE BREAST TISSUE ON MAMMOGRAM: Status: ACTIVE | Noted: 2023-08-02

## 2023-08-02 PROCEDURE — G8420 CALC BMI NORM PARAMETERS: HCPCS | Performed by: NURSE PRACTITIONER

## 2023-08-02 PROCEDURE — 99203 OFFICE O/P NEW LOW 30 MIN: CPT | Performed by: NURSE PRACTITIONER

## 2023-08-02 PROCEDURE — 1036F TOBACCO NON-USER: CPT | Performed by: NURSE PRACTITIONER

## 2023-08-02 PROCEDURE — G8427 DOCREV CUR MEDS BY ELIG CLIN: HCPCS | Performed by: NURSE PRACTITIONER

## 2023-08-02 ASSESSMENT — ENCOUNTER SYMPTOMS
NAUSEA: 0
CHEST TIGHTNESS: 0
SHORTNESS OF BREATH: 0
ANAL BLEEDING: 0
WHEEZING: 0
COUGH: 0
BACK PAIN: 0
CONSTIPATION: 0
CHOKING: 0
DIARRHEA: 0
ABDOMINAL DISTENTION: 0
ABDOMINAL PAIN: 0
RESPIRATORY NEGATIVE: 1
VOMITING: 0

## 2023-08-02 NOTE — PATIENT INSTRUCTIONS
Office will get MRI authorized. Once Authorized schedulers will call to schedule MRI in October. We will call once performed to get you a follow up in office. Any questions or concerns, please contact the office at [phone].

## 2023-08-02 NOTE — PROGRESS NOTES
attention. Continue healthy diet and exercise routinely as tolerated. Avoid alcohol. Limit caffeine intake. Repeat mammogram ***. Continue follow up with Primary Care. RTC 6 months. I spent a total of *** minutes on the date of the service which included preparing to see the patient, face-to-face patient care, completing clinical documentation, obtaining and/or reviewing separately obtained history, performing a medically appropriate examination, counseling and educating the patient/family/caregiver, ordering medications, tests, or procedures, communicating with other HCPs (not separately reported), independently interpreting results (not separately reported), communicating results to the patient/family/caregiver and care coordination (not separately reported). I personally and independently saw and examined patient and reviewed all pertinent laboratory data and imaging studies. This document is generated, in part, by voice recognition software and thus syntax and grammatical errors are possible.     ***  8/2/23
unnecessary (i.e., biopsy, excision, etc.). We reviewed potential risks of Gadolinium and that risks, if any, are not fully understood. Gadolinium is retained for months or years and brain, bone, and other organs. The FDA states there is no clinical evidence that directly links gadolinium retention to adverse effects in patients with normal kidney function. Nephrogenic systemic fibrosis has occurred in patients with impaired elimination of gadolinium based contrast agents. Higher than recommended dosing or repeated dosing appears to increase the risk. We reviewed that it's important to have blood work to ensure adequate kidney function prior to MRI. We discussed prior authorization does not cover the co-pay, and once authorized, she should contact her insurance company to clearly understand the financial impact of testing. MRI of the bilateral breast cannot increase cancer detection rates by 8.2 to 15% per 1000 screens. Limitations include but are not limited to increased risk of false positives, high cost, and gadolinium exposure. Anaphylactic and other hypersensitivity reactions with cardiovascular, respiratory or cutaneous manifestations, ranging from mild to severe, including death, have occurred. She verbalizes understanding and agrees to proceed but will verify coverage and co-pay with her insurance closer to the time. .    Chemoprevention: Briefly discussed the topic of chemoprevention however, she has no first-degree relatives with a diagnosis of cancer. Denies history of DVT/PE  [facility]    At this time, would recommend return to the clinic in October with a baseline MRI prior. We reviewed breast self-examination in detail on this visit and she was advised to contact us in the event she would notice any changes. Plan:    Continue monthly breast self examination; detailed instructions reviewed today.  Bring any changes to your physician's

## 2023-09-06 DIAGNOSIS — D72.819 LEUKOPENIA, UNSPECIFIED TYPE: ICD-10-CM

## 2023-09-06 DIAGNOSIS — E03.9 ACQUIRED HYPOTHYROIDISM: ICD-10-CM

## 2023-09-06 LAB
ABSOLUTE IMMATURE GRANULOCYTE: <0.03 K/UL (ref 0–0.58)
BASOPHILS ABSOLUTE: 0.04 K/UL (ref 0–0.2)
BASOPHILS RELATIVE PERCENT: 1 % (ref 0–2)
EOSINOPHILS ABSOLUTE: 0.16 K/UL (ref 0.05–0.5)
EOSINOPHILS RELATIVE PERCENT: 4 % (ref 0–6)
HCT VFR BLD CALC: 38.5 % (ref 34–48)
HEMOGLOBIN: 12.4 G/DL (ref 11.5–15.5)
IMMATURE GRANULOCYTES: 0 % (ref 0–5)
LYMPHOCYTES ABSOLUTE: 1.6 K/UL (ref 1.5–4)
LYMPHOCYTES RELATIVE PERCENT: 37 % (ref 20–42)
MCH RBC QN AUTO: 29.7 PG (ref 26–35)
MCHC RBC AUTO-ENTMCNC: 32.2 G/DL (ref 32–34.5)
MCV RBC AUTO: 92.1 FL (ref 80–99.9)
MONOCYTES ABSOLUTE: 0.42 K/UL (ref 0.1–0.95)
MONOCYTES RELATIVE PERCENT: 10 % (ref 2–12)
NEUTROPHILS ABSOLUTE: 2.06 K/UL (ref 1.8–7.3)
NEUTROPHILS RELATIVE PERCENT: 48 % (ref 43–80)
PDW BLD-RTO: 13.9 % (ref 11.5–15)
PLATELET # BLD: 270 K/UL (ref 130–450)
PMV BLD AUTO: 10.9 FL (ref 7–12)
RBC # BLD: 4.18 M/UL (ref 3.5–5.5)
T4 FREE: 1.1 NG/DL (ref 0.9–1.7)
TSH SERPL DL<=0.05 MIU/L-ACNC: 4.88 UIU/ML (ref 0.27–4.2)
WBC # BLD: 4.3 K/UL (ref 4.5–11.5)

## 2023-09-12 LAB
THYROGLOBULIN AB: <12 IU/ML (ref 0–40)
THYROID PEROXIDASE (TPO) AB: 80 IU/ML (ref 0–25)

## 2023-09-18 DIAGNOSIS — E03.9 ACQUIRED HYPOTHYROIDISM: ICD-10-CM

## 2023-09-18 RX ORDER — LEVOTHYROXINE SODIUM 0.05 MG/1
50 TABLET ORAL DAILY
Qty: 30 TABLET | Refills: 3 | Status: SHIPPED | OUTPATIENT
Start: 2023-09-18

## 2023-10-03 ENCOUNTER — TELEPHONE (OUTPATIENT)
Dept: BREAST CENTER | Age: 48
End: 2023-10-03

## 2023-10-03 DIAGNOSIS — E03.9 ACQUIRED HYPOTHYROIDISM: ICD-10-CM

## 2023-10-03 RX ORDER — LEVOTHYROXINE SODIUM 0.03 MG/1
25 TABLET ORAL DAILY
Qty: 90 TABLET | Refills: 1 | OUTPATIENT
Start: 2023-10-03

## 2023-10-03 NOTE — TELEPHONE ENCOUNTER
Called and spoke with patient in regards to starting the process of authorizing her breast MRI. Patient will call her insurance to see what the out of pocket cost will be. Order given to Fabrizio CRONIN to start the process.     Electronically signed by Do Teixeira RN on 10/3/23 at 3:10 PM EDT

## 2023-10-06 ENCOUNTER — TELEPHONE (OUTPATIENT)
Dept: BREAST CENTER | Age: 48
End: 2023-10-06

## 2023-11-01 ENCOUNTER — TELEPHONE (OUTPATIENT)
Dept: BREAST CENTER | Age: 48
End: 2023-11-01

## 2023-11-01 NOTE — TELEPHONE ENCOUNTER
Called patient and left detailed message with call back number to discuss scheduling her breast MRI. Authorization expires 11/20/23.     Electronically signed by Margarita Tam RN on 11/1/23 at 10:26 AM EDT

## 2023-11-02 ENCOUNTER — TELEPHONE (OUTPATIENT)
Dept: BREAST CENTER | Age: 48
End: 2023-11-02

## 2023-11-02 NOTE — TELEPHONE ENCOUNTER
Called and spoke with patient reminding her that labs need done prior to breast MRI. Informed her schedulers will be calling her today to schedule and her authorization expires 11/20. Patient verbalizes understanding.     Electronically signed by Brayan Jack RN on 11/2/23 at 11:33 AM EDT

## 2023-11-22 ENCOUNTER — TELEPHONE (OUTPATIENT)
Dept: BREAST CENTER | Age: 48
End: 2023-11-22

## 2023-11-22 NOTE — TELEPHONE ENCOUNTER
Called and spoke with patient regarding scheduling her breast MRI. Patient states that although her insurance authorized the test, she is responsible for payment. Patient states she cannot do that right now and is declining scheduling. Will notify Josie Cooley to see what next steps should be. Told patient we will be in touch.     Electronically signed by Lamberto Webb RN on 11/22/23 at 10:01 AM EST

## 2023-11-27 ENCOUNTER — TELEPHONE (OUTPATIENT)
Dept: BREAST CENTER | Age: 48
End: 2023-11-27

## 2023-11-27 NOTE — TELEPHONE ENCOUNTER
Called and spoke with patient regarding scheduling a bilateral complete breast ultrasound since breast MRI not covered with her insurance and much too expensive. Patient requesting A 8AM. Patient aware we will need a follow up clinical visit scheduled.     Electronically signed by Lamberto Webb RN on 11/27/23 at 9:06 AM EST

## 2023-12-08 ENCOUNTER — TELEPHONE (OUTPATIENT)
Dept: GENERAL RADIOLOGY | Age: 48
End: 2023-12-08

## 2023-12-08 ENCOUNTER — OFFICE VISIT (OUTPATIENT)
Dept: FAMILY MEDICINE CLINIC | Age: 48
End: 2023-12-08
Payer: COMMERCIAL

## 2023-12-08 VITALS
WEIGHT: 154.2 LBS | SYSTOLIC BLOOD PRESSURE: 120 MMHG | TEMPERATURE: 98.1 F | BODY MASS INDEX: 23.45 KG/M2 | OXYGEN SATURATION: 99 % | DIASTOLIC BLOOD PRESSURE: 64 MMHG | HEART RATE: 80 BPM

## 2023-12-08 DIAGNOSIS — E03.9 ACQUIRED HYPOTHYROIDISM: Primary | ICD-10-CM

## 2023-12-08 DIAGNOSIS — E04.1 THYROID NODULE: ICD-10-CM

## 2023-12-08 DIAGNOSIS — Z91.89 AT HIGH RISK FOR BREAST CANCER: ICD-10-CM

## 2023-12-08 PROCEDURE — G8420 CALC BMI NORM PARAMETERS: HCPCS | Performed by: FAMILY MEDICINE

## 2023-12-08 PROCEDURE — G8482 FLU IMMUNIZE ORDER/ADMIN: HCPCS | Performed by: FAMILY MEDICINE

## 2023-12-08 PROCEDURE — G8427 DOCREV CUR MEDS BY ELIG CLIN: HCPCS | Performed by: FAMILY MEDICINE

## 2023-12-08 PROCEDURE — 1036F TOBACCO NON-USER: CPT | Performed by: FAMILY MEDICINE

## 2023-12-08 PROCEDURE — 99214 OFFICE O/P EST MOD 30 MIN: CPT | Performed by: FAMILY MEDICINE

## 2023-12-08 RX ORDER — LEVOTHYROXINE SODIUM 0.05 MG/1
50 TABLET ORAL DAILY
Qty: 90 TABLET | Refills: 1 | Status: SHIPPED | OUTPATIENT
Start: 2023-12-08

## 2023-12-08 NOTE — TELEPHONE ENCOUNTER
A message was left for a return call to schedule her MRI. The office has been notified of the third attempt to reach patient for scheduling.

## 2024-01-18 DIAGNOSIS — E55.9 VITAMIN D DEFICIENCY, UNSPECIFIED: ICD-10-CM

## 2024-01-18 RX ORDER — ERGOCALCIFEROL 1.25 MG/1
50000 CAPSULE ORAL WEEKLY
Qty: 12 CAPSULE | Refills: 2 | OUTPATIENT
Start: 2024-01-18

## 2024-03-14 ENCOUNTER — TELEPHONE (OUTPATIENT)
Dept: BREAST CENTER | Age: 49
End: 2024-03-14

## 2024-03-14 DIAGNOSIS — Z12.31 VISIT FOR SCREENING MAMMOGRAM: Primary | ICD-10-CM

## 2024-03-14 DIAGNOSIS — Z80.3 FAMILY HISTORY OF MALIGNANT NEOPLASM OF FEMALE BREAST: ICD-10-CM

## 2024-03-14 NOTE — TELEPHONE ENCOUNTER
Called patient and left detailed message with call back number to discuss her upcoming ultrasound appointment. Since patient is due for her bilateral screening mammogram, will change order from bilateral complete ultrasound to bilateral screening mammogram with office visit with NP, Magda Castillo. Informed patient on message that I will be cancelling her breast ultrasound on Friday and to please call our office so we can reschedule her appropriately.    Electronically signed by Evelyn Corral RN on 3/14/24 at 8:56 AM EDT

## 2024-03-18 NOTE — PROGRESS NOTES
back pain and myalgias.   Neurological: Negative.  Negative for dizziness, tremors, seizures, syncope, speech difficulty, weakness, light-headedness, numbness and headaches.   Psychiatric/Behavioral:  The patient is not nervous/anxious.         Recent stress following placement of her father in a memory care facility; reports her and her family are slowly trying to adjust.   Unless otherwise stated in this report the patient's positive and negative responses for review of systems for constitutional, eyes, ENT, cardiovascular, respiratory, gastrointestinal, neurological, , musculoskeletal, and integument systems and related systems to the presenting problem are either stated in the history of present illness or were not pertinent or were negative for the symptoms and/or complaints related to the presenting medical problem.  Positives and pertinent negatives as per HPI.  All others reviewed and are negative.    Objective:   Physical Exam  Vitals and nursing note reviewed.   Constitutional:       General: She is not in acute distress.     Appearance: Normal appearance. She is normal weight. She is not ill-appearing, toxic-appearing or diaphoretic.      Comments: Continued great performance status.  Pleasant and conversant and in no apparent distress.   HENT:      Head: Normocephalic and atraumatic.   Eyes:      Extraocular Movements: Extraocular movements intact.      Conjunctiva/sclera: Conjunctivae normal.   Cardiovascular:      Rate and Rhythm: Normal rate and regular rhythm.      Heart sounds: Normal heart sounds.   Pulmonary:      Effort: Pulmonary effort is normal.      Breath sounds: Normal breath sounds.      Comments: LCTAB; no increased work of breathing  Chest:      Chest wall: No mass or deformity (Pectus excavatum).   Breasts:     Breasts are asymmetrical (Left breast 1/2 cup size > right).          Comments: Breast tissue is dense bilaterally. No skin dimpling or puckering.  No nipple discharge.  No

## 2024-04-04 ENCOUNTER — OFFICE VISIT (OUTPATIENT)
Dept: BREAST CENTER | Age: 49
End: 2024-04-04
Payer: COMMERCIAL

## 2024-04-04 ENCOUNTER — HOSPITAL ENCOUNTER (OUTPATIENT)
Dept: GENERAL RADIOLOGY | Age: 49
Discharge: HOME | End: 2024-04-06
Payer: COMMERCIAL

## 2024-04-04 VITALS — WEIGHT: 145 LBS | BODY MASS INDEX: 22.05 KG/M2

## 2024-04-04 VITALS
BODY MASS INDEX: 23.19 KG/M2 | RESPIRATION RATE: 14 BRPM | SYSTOLIC BLOOD PRESSURE: 124 MMHG | OXYGEN SATURATION: 100 % | WEIGHT: 153 LBS | TEMPERATURE: 98.2 F | DIASTOLIC BLOOD PRESSURE: 64 MMHG | HEIGHT: 68 IN | HEART RATE: 97 BPM

## 2024-04-04 DIAGNOSIS — E03.9 ACQUIRED HYPOTHYROIDISM: ICD-10-CM

## 2024-04-04 DIAGNOSIS — Z80.3 FAMILY HISTORY OF MALIGNANT NEOPLASM OF FEMALE BREAST: ICD-10-CM

## 2024-04-04 DIAGNOSIS — R92.30 DENSE BREAST: Primary | ICD-10-CM

## 2024-04-04 DIAGNOSIS — Z12.31 VISIT FOR SCREENING MAMMOGRAM: ICD-10-CM

## 2024-04-04 DIAGNOSIS — Z91.89 AT HIGH RISK FOR BREAST CANCER: ICD-10-CM

## 2024-04-04 LAB
T4 FREE: 1.4 NG/DL (ref 0.9–1.7)
TSH SERPL DL<=0.05 MIU/L-ACNC: 3.88 UIU/ML (ref 0.27–4.2)

## 2024-04-04 PROCEDURE — 99213 OFFICE O/P EST LOW 20 MIN: CPT | Performed by: NURSE PRACTITIONER

## 2024-04-04 PROCEDURE — 1036F TOBACCO NON-USER: CPT | Performed by: NURSE PRACTITIONER

## 2024-04-04 PROCEDURE — G8427 DOCREV CUR MEDS BY ELIG CLIN: HCPCS | Performed by: NURSE PRACTITIONER

## 2024-04-04 PROCEDURE — 77063 BREAST TOMOSYNTHESIS BI: CPT

## 2024-04-04 PROCEDURE — G8420 CALC BMI NORM PARAMETERS: HCPCS | Performed by: NURSE PRACTITIONER

## 2024-04-04 PROCEDURE — 3017F COLORECTAL CA SCREEN DOC REV: CPT | Performed by: NURSE PRACTITIONER

## 2024-04-04 NOTE — PATIENT INSTRUCTIONS
DEEPAK SNOW IBS SCORE:  22%.     GRADE C BREAST DENSITY.    Breast MRI in October, call in September to start the process. No cycles. Will need labs. Are good for 30 days

## 2024-04-13 DIAGNOSIS — Z00.00 ENCOUNTER FOR PREVENTIVE CARE: ICD-10-CM

## 2024-04-13 DIAGNOSIS — J30.81 CAT ALLERGIES: ICD-10-CM

## 2024-04-15 RX ORDER — LEVALBUTEROL TARTRATE 45 UG/1
AEROSOL, METERED ORAL
Refills: 0 | OUTPATIENT
Start: 2024-04-15

## 2024-05-20 ENCOUNTER — OFFICE VISIT (OUTPATIENT)
Dept: PODIATRY | Age: 49
End: 2024-05-20
Payer: COMMERCIAL

## 2024-05-20 VITALS — HEIGHT: 68 IN | WEIGHT: 153 LBS | BODY MASS INDEX: 23.19 KG/M2

## 2024-05-20 DIAGNOSIS — R26.2 DIFFICULTY WALKING: ICD-10-CM

## 2024-05-20 DIAGNOSIS — M79.89 MASS OF SOFT TISSUE OF FOOT: Primary | ICD-10-CM

## 2024-05-20 PROCEDURE — G8420 CALC BMI NORM PARAMETERS: HCPCS | Performed by: PODIATRIST

## 2024-05-20 PROCEDURE — G8427 DOCREV CUR MEDS BY ELIG CLIN: HCPCS | Performed by: PODIATRIST

## 2024-05-20 PROCEDURE — 3017F COLORECTAL CA SCREEN DOC REV: CPT | Performed by: PODIATRIST

## 2024-05-20 PROCEDURE — 99203 OFFICE O/P NEW LOW 30 MIN: CPT | Performed by: PODIATRIST

## 2024-05-20 PROCEDURE — 1036F TOBACCO NON-USER: CPT | Performed by: PODIATRIST

## 2024-05-20 NOTE — PROGRESS NOTES
Patient here for bump on side of left foot. Patient states no known injury, not painful. Shameka Romero MD last visit 6/2/2023.   Electronically signed by Sabrina Deutsch LPN on 5/20/2024 at 4:40 PM

## 2024-05-21 NOTE — PROGRESS NOTES
24     Marlin Boswell    : 1975 Sex: female   Age: 49 y.o.    Patient was referred by: None  Patient's PCP/Provider is:  Shameka Romero MD    Subjective:    Patient seen today for evaluation regarding evaluation soft tissue mass left foot    Chief Complaint   Patient presents with    bump on foot     Left foot bump on side       HPI: Patient stated she noticed the issue a few months ago.  Area has gotten slightly larger since initial presentation.  Patient is not having any issues with everyday ambulation and/or shoe irritation.  Patient did want to discuss potential treatment options available at this time.  No other additional abnormalities noted.    ROS:  Const: Positives and pertinent negatives as per HPI.     Musculo: Denies symptoms other than stated above.  Neuro: Denies symptoms other than stated above.  Skin: Denies symptoms other than stated above.    Current Medications:    Current Outpatient Medications:     levothyroxine (SYNTHROID) 50 MCG tablet, Take 1 tablet by mouth daily, Disp: 90 tablet, Rfl: 1    albuterol sulfate HFA (PROVENTIL;VENTOLIN;PROAIR) 108 (90 Base) MCG/ACT inhaler, Inhale 2 puffs into the lungs every 4 hours as needed for Wheezing, Disp: 3 each, Rfl: 1    Multiple Vitamins-Minerals (THERAPEUTIC MULTIVITAMIN-MINERALS) tablet, Take 1 tablet by mouth daily, Disp: , Rfl:     EPINEPHrine (EPIPEN 2-SHARON) 0.3 MG/0.3ML SOAJ injection, Inject 0.3 mLs into the muscle once for 1 dose Use as directed for allergic reaction, Disp: 0.3 mL, Rfl: 1    Allergies:  Allergies   Allergen Reactions    Bee Venom        Vitals:    24 1639   Weight: 69.4 kg (153 lb)   Height: 1.727 m (5' 8\")        Past Medical History:   Diagnosis Date    Allergic rhinitis     Hypothyroidism     Presbyopia 07/15/2016    Thyroid nodule      Family History   Problem Relation Age of Onset    Heart Disease Father 70    High Blood Pressure Father     Cancer Brother 47        bladder    Other Brother

## 2024-06-03 ENCOUNTER — OFFICE VISIT (OUTPATIENT)
Dept: PODIATRY | Age: 49
End: 2024-06-03
Payer: COMMERCIAL

## 2024-06-03 VITALS — HEIGHT: 69 IN | BODY MASS INDEX: 22.66 KG/M2 | WEIGHT: 153 LBS

## 2024-06-03 DIAGNOSIS — M79.89 MASS OF SOFT TISSUE OF FOOT: Primary | ICD-10-CM

## 2024-06-03 DIAGNOSIS — R26.2 DIFFICULTY WALKING: ICD-10-CM

## 2024-06-03 DIAGNOSIS — M79.672 PAIN IN LEFT FOOT: ICD-10-CM

## 2024-06-03 PROCEDURE — G8420 CALC BMI NORM PARAMETERS: HCPCS | Performed by: PODIATRIST

## 2024-06-03 PROCEDURE — 1036F TOBACCO NON-USER: CPT | Performed by: PODIATRIST

## 2024-06-03 PROCEDURE — 3017F COLORECTAL CA SCREEN DOC REV: CPT | Performed by: PODIATRIST

## 2024-06-03 PROCEDURE — G8427 DOCREV CUR MEDS BY ELIG CLIN: HCPCS | Performed by: PODIATRIST

## 2024-06-03 PROCEDURE — 99213 OFFICE O/P EST LOW 20 MIN: CPT | Performed by: PODIATRIST

## 2024-06-03 NOTE — PROGRESS NOTES
Patient is in today for 2 week left foot follow up. Patient says the bump is still there and the same as last week. Pcp is Shameka Romero MD  Last ov 12/8/23

## 2024-06-03 NOTE — PROGRESS NOTES
6/3/24     Marlin Boswell    : 1975   Sex: female    Age: 49 y.o.    Patient's PCP/Provider is:  Shameka Romero MD    Subjective:  Patient is seen today for follow-up regarding continued care regarding soft tissue mass left lateral midfoot region.  Patient stated current issue did not improve with compression applied and topical medications.  Patient still changing of shoe gear and having mild discomfort in the area.  No other additional abnormalities noted at this time.    Chief Complaint   Patient presents with    Foot Pain     Left foot        ROS:  Const: Positives and pertinent negatives as per HPI.    Musculo: Denies symptoms other than stated above.  Neuro: Denies symptoms other than stated above.  Skin: Denies symptoms other than stated above.    Current Medications:    Current Outpatient Medications:     levothyroxine (SYNTHROID) 50 MCG tablet, Take 1 tablet by mouth daily, Disp: 90 tablet, Rfl: 1    albuterol sulfate HFA (PROVENTIL;VENTOLIN;PROAIR) 108 (90 Base) MCG/ACT inhaler, Inhale 2 puffs into the lungs every 4 hours as needed for Wheezing, Disp: 3 each, Rfl: 1    Multiple Vitamins-Minerals (THERAPEUTIC MULTIVITAMIN-MINERALS) tablet, Take 1 tablet by mouth daily, Disp: , Rfl:     EPINEPHrine (EPIPEN 2-SHARON) 0.3 MG/0.3ML SOAJ injection, Inject 0.3 mLs into the muscle once for 1 dose Use as directed for allergic reaction, Disp: 0.3 mL, Rfl: 1    Allergies:  Allergies   Allergen Reactions    Bee Venom        Vitals:    24 1627   Weight: 69.4 kg (153 lb)   Height: 1.757 m (5' 9.17\")       Exam:  Neurovascular status unchanged.  Soft tissue mass noted lateral to the calcaneocuboid joint region.  Area measures approximately 2.5 cm in diameter.  No overlying hyperpigmentation noted.  No erythema, calor, or any signs of infection noted left foot.  Adequate range of motion left ankle and subtalar joint noted.        Diagnostic Studies:     No results found.      Procedures:    None    Plan

## 2024-06-18 ASSESSMENT — PATIENT HEALTH QUESTIONNAIRE - PHQ9
1. LITTLE INTEREST OR PLEASURE IN DOING THINGS: SEVERAL DAYS
SUM OF ALL RESPONSES TO PHQ QUESTIONS 1-9: 2
SUM OF ALL RESPONSES TO PHQ QUESTIONS 1-9: 2
SUM OF ALL RESPONSES TO PHQ9 QUESTIONS 1 & 2: 2
SUM OF ALL RESPONSES TO PHQ QUESTIONS 1-9: 2
1. LITTLE INTEREST OR PLEASURE IN DOING THINGS: SEVERAL DAYS
SUM OF ALL RESPONSES TO PHQ9 QUESTIONS 1 & 2: 2
SUM OF ALL RESPONSES TO PHQ QUESTIONS 1-9: 2
2. FEELING DOWN, DEPRESSED OR HOPELESS: SEVERAL DAYS
2. FEELING DOWN, DEPRESSED OR HOPELESS: SEVERAL DAYS

## 2024-06-21 ENCOUNTER — OFFICE VISIT (OUTPATIENT)
Dept: FAMILY MEDICINE CLINIC | Age: 49
End: 2024-06-21
Payer: COMMERCIAL

## 2024-06-21 VITALS
SYSTOLIC BLOOD PRESSURE: 108 MMHG | DIASTOLIC BLOOD PRESSURE: 66 MMHG | HEART RATE: 100 BPM | OXYGEN SATURATION: 98 % | BODY MASS INDEX: 23.25 KG/M2 | WEIGHT: 153.4 LBS | TEMPERATURE: 97.1 F | HEIGHT: 68 IN

## 2024-06-21 DIAGNOSIS — F41.9 ANXIETY: ICD-10-CM

## 2024-06-21 DIAGNOSIS — E55.9 VITAMIN D DEFICIENCY, UNSPECIFIED: ICD-10-CM

## 2024-06-21 DIAGNOSIS — Z00.00 ENCOUNTER FOR PREVENTIVE CARE: Primary | ICD-10-CM

## 2024-06-21 PROCEDURE — 99396 PREV VISIT EST AGE 40-64: CPT | Performed by: FAMILY MEDICINE

## 2024-06-21 RX ORDER — ESCITALOPRAM OXALATE 5 MG/1
5 TABLET ORAL DAILY
Qty: 30 TABLET | Refills: 4 | Status: SHIPPED | OUTPATIENT
Start: 2024-06-21

## 2024-06-21 SDOH — ECONOMIC STABILITY: FOOD INSECURITY: WITHIN THE PAST 12 MONTHS, THE FOOD YOU BOUGHT JUST DIDN'T LAST AND YOU DIDN'T HAVE MONEY TO GET MORE.: NEVER TRUE

## 2024-06-21 SDOH — ECONOMIC STABILITY: FOOD INSECURITY: WITHIN THE PAST 12 MONTHS, YOU WORRIED THAT YOUR FOOD WOULD RUN OUT BEFORE YOU GOT MONEY TO BUY MORE.: NEVER TRUE

## 2024-06-21 SDOH — ECONOMIC STABILITY: INCOME INSECURITY: HOW HARD IS IT FOR YOU TO PAY FOR THE VERY BASICS LIKE FOOD, HOUSING, MEDICAL CARE, AND HEATING?: NOT HARD AT ALL

## 2024-06-21 NOTE — PROGRESS NOTES
CC: Marlin Boswell is a 49 y.o. yo female here for evaluation of the following medical concerns: Annual Exam (Physical) and Grief (Father passed away this week.)      HPI:    Anxiety / depression; has been in counseling for about 1 year with Sherita Robles; follows with her every 6 weeks; feels a little overwhelmed with recent passing of father and stressors from ex; having difficulty staying on task and / or getting out to do things like riding her bike    Mild intermittent asthma - exacerbated by animal dander; uses albuterol PRN -- per previous  Hashimoto hypothyroidism:  synthroid now on 50mcg   Benign thryoid nodule in the past - US done at [facility]; no records on this  Mild leukopenia  Bee venom allergy; worsening reaction / swelling after bee stings; at this time no anaphylaxis but has been getting worsening reaction with subsequent stings -- per previous  Preventive: Patient has a Tyrer-Cuzick score of: 21.7%; Scattered fibroglandular density; obtains mammo / pap through [facility]; Dr. Interiano; Referral to TM and planning alternating MRI    Adult Screening:  Blood pressure normotensive: Yes   Obesity (BMI 30+): No  Diabetes screen: NA   Statin for CVD events and mortality preventive medication: see labs  Aspirin for CVD and colon cancer preventive medication: see labs  Diet to prevent CV disease (adults with cardiovascular risk factors): NA; Patient's cardiovascular risk history includes: none  Alcohol use:   reports current alcohol use.  Tobacco abuse:   reports that she has never smoked. She has never used smokeless tobacco.  Depression screening: PHQ-9 Total Score: 2 (6/18/2024  3:47 PM)  Annual lung cancer screening: NA   Colorectal Cancer Screening, average risk (45-75yrs): NA   Hx of CRC before age 60yrs in FDR: NA   Hep C virus infection screening: NA   Fall prevention: NA     Sexually active specific:   Sexual activity: single partner, contraception - NA; High risk behavior: none,

## 2024-06-21 NOTE — PROGRESS NOTES
Father passed this past Monday ***  Thyroid ok     Screening MRI; TM **  Did start to see psychologist for anxiety; every 6 weeks    Sherita Robles  Diveroced 13

## 2024-06-27 DIAGNOSIS — E55.9 VITAMIN D DEFICIENCY, UNSPECIFIED: ICD-10-CM

## 2024-06-27 DIAGNOSIS — Z00.00 ENCOUNTER FOR PREVENTIVE CARE: ICD-10-CM

## 2024-06-27 LAB
ALBUMIN: 4.6 G/DL (ref 3.5–5.2)
ALP BLD-CCNC: 94 U/L (ref 35–104)
ALT SERPL-CCNC: 10 U/L (ref 0–32)
ANION GAP SERPL CALCULATED.3IONS-SCNC: 13 MMOL/L (ref 7–16)
AST SERPL-CCNC: 19 U/L (ref 0–31)
BASOPHILS ABSOLUTE: 0.08 K/UL (ref 0–0.2)
BASOPHILS RELATIVE PERCENT: 2 % (ref 0–2)
BILIRUB SERPL-MCNC: 0.3 MG/DL (ref 0–1.2)
BUN BLDV-MCNC: 10 MG/DL (ref 6–20)
CALCIUM SERPL-MCNC: 9.2 MG/DL (ref 8.6–10.2)
CHLORIDE BLD-SCNC: 100 MMOL/L (ref 98–107)
CHOLESTEROL, TOTAL: 202 MG/DL
CO2: 24 MMOL/L (ref 22–29)
CREAT SERPL-MCNC: 0.7 MG/DL (ref 0.5–1)
EOSINOPHILS ABSOLUTE: 0.36 K/UL (ref 0.05–0.5)
EOSINOPHILS RELATIVE PERCENT: 7 % (ref 0–6)
GFR, ESTIMATED: >90 ML/MIN/1.73M2
GLUCOSE BLD-MCNC: 91 MG/DL (ref 74–99)
HCT VFR BLD CALC: 40.7 % (ref 34–48)
HDLC SERPL-MCNC: 66 MG/DL
HEMOGLOBIN: 12.7 G/DL (ref 11.5–15.5)
IMMATURE GRANULOCYTES %: 0 % (ref 0–5)
IMMATURE GRANULOCYTES ABSOLUTE: <0.03 K/UL (ref 0–0.58)
LDL CHOLESTEROL: 115 MG/DL
LYMPHOCYTES ABSOLUTE: 2.02 K/UL (ref 1.5–4)
LYMPHOCYTES RELATIVE PERCENT: 39 % (ref 20–42)
MCH RBC QN AUTO: 29.1 PG (ref 26–35)
MCHC RBC AUTO-ENTMCNC: 31.2 G/DL (ref 32–34.5)
MCV RBC AUTO: 93.3 FL (ref 80–99.9)
MONOCYTES ABSOLUTE: 0.5 K/UL (ref 0.1–0.95)
MONOCYTES RELATIVE PERCENT: 10 % (ref 2–12)
NEUTROPHILS ABSOLUTE: 2.27 K/UL (ref 1.8–7.3)
NEUTROPHILS RELATIVE PERCENT: 43 % (ref 43–80)
PDW BLD-RTO: 13.5 % (ref 11.5–15)
PLATELET # BLD: 322 K/UL (ref 130–450)
PMV BLD AUTO: 10.6 FL (ref 7–12)
POTASSIUM SERPL-SCNC: 4.2 MMOL/L (ref 3.5–5)
RBC # BLD: 4.36 M/UL (ref 3.5–5.5)
SODIUM BLD-SCNC: 137 MMOL/L (ref 132–146)
T4 FREE: 1.4 NG/DL (ref 0.9–1.7)
TOTAL PROTEIN: 7.5 G/DL (ref 6.4–8.3)
TRIGL SERPL-MCNC: 104 MG/DL
TSH SERPL DL<=0.05 MIU/L-ACNC: 1.3 UIU/ML (ref 0.27–4.2)
VITAMIN D 25-HYDROXY: 30.4 NG/ML (ref 30–100)
VLDLC SERPL CALC-MCNC: 21 MG/DL
WBC # BLD: 5.2 K/UL (ref 4.5–11.5)

## 2024-07-07 DIAGNOSIS — E03.9 ACQUIRED HYPOTHYROIDISM: ICD-10-CM

## 2024-07-08 DIAGNOSIS — E03.9 ACQUIRED HYPOTHYROIDISM: ICD-10-CM

## 2024-07-08 RX ORDER — LEVOTHYROXINE SODIUM 0.05 MG/1
50 TABLET ORAL DAILY
Qty: 90 TABLET | Refills: 1 | OUTPATIENT
Start: 2024-07-08

## 2024-07-08 RX ORDER — LEVOTHYROXINE SODIUM 0.05 MG/1
50 TABLET ORAL DAILY
Qty: 90 TABLET | Refills: 1 | Status: SHIPPED | OUTPATIENT
Start: 2024-07-08

## 2024-07-08 NOTE — TELEPHONE ENCOUNTER
Medication Refill Request    LOV 6/21/2024  NOV 10/18/2024    Lab Results   Component Value Date    CREATININE 0.7 06/27/2024

## 2024-09-25 ENCOUNTER — TELEPHONE (OUTPATIENT)
Dept: BREAST CENTER | Age: 49
End: 2024-09-25

## 2024-10-12 DIAGNOSIS — F41.9 ANXIETY: ICD-10-CM

## 2024-10-13 RX ORDER — ESCITALOPRAM OXALATE 5 MG/1
5 TABLET ORAL DAILY
Qty: 90 TABLET | Refills: 1 | OUTPATIENT
Start: 2024-10-13

## 2024-10-16 DIAGNOSIS — Z80.3 FAMILY HISTORY OF MALIGNANT NEOPLASM OF FEMALE BREAST: ICD-10-CM

## 2024-10-16 DIAGNOSIS — R92.30 DENSE BREAST: Primary | ICD-10-CM

## 2024-10-16 DIAGNOSIS — Z91.89 AT HIGH RISK FOR BREAST CANCER: ICD-10-CM

## 2024-10-18 ENCOUNTER — OFFICE VISIT (OUTPATIENT)
Dept: FAMILY MEDICINE CLINIC | Age: 49
End: 2024-10-18

## 2024-10-18 VITALS
OXYGEN SATURATION: 98 % | HEIGHT: 68 IN | WEIGHT: 152.4 LBS | BODY MASS INDEX: 23.1 KG/M2 | HEART RATE: 76 BPM | DIASTOLIC BLOOD PRESSURE: 66 MMHG | TEMPERATURE: 96.9 F | SYSTOLIC BLOOD PRESSURE: 110 MMHG

## 2024-10-18 DIAGNOSIS — J30.81 CAT ALLERGIES: ICD-10-CM

## 2024-10-18 DIAGNOSIS — E03.9 ACQUIRED HYPOTHYROIDISM: ICD-10-CM

## 2024-10-18 DIAGNOSIS — Z00.00 ENCOUNTER FOR PREVENTIVE CARE: ICD-10-CM

## 2024-10-18 DIAGNOSIS — F41.9 ANXIETY: ICD-10-CM

## 2024-10-18 RX ORDER — ALBUTEROL SULFATE 90 UG/1
2 INHALANT RESPIRATORY (INHALATION) EVERY 4 HOURS PRN
Qty: 3 EACH | Refills: 1 | Status: SHIPPED | OUTPATIENT
Start: 2024-10-18

## 2024-10-18 RX ORDER — ESCITALOPRAM OXALATE 5 MG/1
5 TABLET ORAL DAILY
Qty: 90 TABLET | Refills: 1 | Status: SHIPPED | OUTPATIENT
Start: 2024-10-18

## 2024-10-18 RX ORDER — LEVOTHYROXINE SODIUM 50 UG/1
50 TABLET ORAL DAILY
Qty: 90 TABLET | Refills: 1 | Status: SHIPPED | OUTPATIENT
Start: 2024-10-18

## 2024-10-18 NOTE — PROGRESS NOTES
CC: Marlin Boswell is a 49 y.o. yo female is here for evaluation evaluation for the following chronic medical concerns: Hypothyroidism      HPI:    Anxiety / depression; has been in counseling for about 1 year with Sherita Robles; follows with her every 6 weeks; feels a little overwhelmed with recent passing of father and stressors from ex; having difficulty staying on task and / or getting out to do things like riding her bike  Interval hx:  Doing ok; did star the lexapro 5mg and doing well; feels less anxious and able to do more things; mom is doing ok; still in counseling every 4-6 weeks      Chronic per previous  HLD: ASCVD 0.7% not on medical therapy  Mild intermittent asthma - exacerbated by animal dander; uses albuterol PRN -- per previous  Hashimoto hypothyroidism:  synthroid now on 50mcg   Benign thryoid nodule in the past -  done at [facility]; no records on this  Mild leukopenia  Bee venom allergy; worsening reaction / swelling after bee stings; at this time no anaphylaxis but has been getting worsening reaction with subsequent stings -- per previous  Preventive: Patient has a Tyrer-Cuzick score of: 21.7%; Scattered fibroglandular density; obtains mammo / pap through [facility]; Dr. Interiano; Referral to TM and planning alternating MRI      Vitals:   /66 (Site: Left Upper Arm, Position: Sitting)   Pulse 76   Temp 96.9 °F (36.1 °C)   Ht 1.715 m (5' 7.5\")   Wt 69.1 kg (152 lb 6.4 oz)   LMP  (LMP Unknown)   SpO2 98%   BMI 23.52 kg/m²   Wt Readings from Last 3 Encounters:   10/18/24 69.1 kg (152 lb 6.4 oz)   06/21/24 69.6 kg (153 lb 6.4 oz)   06/03/24 69.4 kg (153 lb)       PE:  Constitutional - alert, well appearing, and in no distress  Eyes - extraocular eye movements intact, left eye normal, right eye normal, no conjunctivitis noted  Neck - symmetric, no obvious masses noted  Respiratory- clear to auscultation, no wheezes, rales or rhonchi, symmetric air entry; no increased work of

## 2024-10-23 ENCOUNTER — TELEPHONE (OUTPATIENT)
Dept: BREAST CENTER | Age: 49
End: 2024-10-23

## 2024-10-23 NOTE — TELEPHONE ENCOUNTER
Authorization is pending clinical review with [facility] for breast MRI [id number] -- case # [id number]  review may take 3to5 business days - Spoke with Juan J URIAS

## 2024-12-12 NOTE — TELEPHONE ENCOUNTER
Authorization is pending for breast MRI [id number] - spoke with Leo Hutchinson -  [facility] / [facility] - may take 2 business days for review -- faxed patient information to Clinical Review - Case# [id number] Describes his self cutting episodes as suicide attempts although on further elicitation he states that while he did want to die while cutting himself, he knew in the back of his mind that he would really need to cut much deeper or obtain a gun to kill himself. States that he understands cutting himself is dangerous but at the levels he was cutting, he thought it highly unlikely that he would devyn. Maternal grandfather's brother  by suicide. Maternal grandfather, father history of substance abuse. Family did not give pediatrician's phone number or sign consent at this time See note

## 2024-12-27 ENCOUNTER — TELEPHONE (OUTPATIENT)
Dept: BREAST CENTER | Age: 49
End: 2024-12-27

## 2024-12-27 NOTE — TELEPHONE ENCOUNTER
Authorization is pending clinical review with [employer] -- patient information has been upload - Tracking# [id number] may take 3-5 business days for approval.

## 2024-12-30 NOTE — TELEPHONE ENCOUNTER
Breast MRI  [id number] Approved  Aprroval # [id number]  valid 1/1/25/- 2/15/25  per [employer] [place] insurance

## 2025-01-03 ENCOUNTER — TELEPHONE (OUTPATIENT)
Dept: BREAST CENTER | Age: 50
End: 2025-01-03

## 2025-01-03 DIAGNOSIS — F41.8 TEST ANXIETY: Primary | ICD-10-CM

## 2025-01-03 RX ORDER — LORAZEPAM 1 MG/1
TABLET ORAL
Qty: 2 TABLET | Refills: 0 | Status: SHIPPED | OUTPATIENT
Start: 2025-01-03 | End: 2025-02-10

## 2025-01-03 NOTE — PROGRESS NOTES
Ativan script to pharmacy for her test anxiety related to MRI of the bilateral breast.  She reported she will have a  the day of the test    Magda Castillo (Terrie) RN, MSN, APRN-CNP, AOCNP  Advanced Oncology Certified Nurse Practitioner  [facility]-Department of Breast Surgery   Office Phone [phone]; Fax [phone]  Located within the [facility]

## 2025-01-24 DIAGNOSIS — R92.30 DENSE BREAST: ICD-10-CM

## 2025-01-24 DIAGNOSIS — Z91.89 AT HIGH RISK FOR BREAST CANCER: ICD-10-CM

## 2025-01-24 DIAGNOSIS — Z80.3 FAMILY HISTORY OF MALIGNANT NEOPLASM OF FEMALE BREAST: ICD-10-CM

## 2025-01-24 LAB
BUN BLDV-MCNC: 19 MG/DL (ref 6–20)
CREAT SERPL-MCNC: 0.8 MG/DL (ref 0.5–1)
GFR, ESTIMATED: 87 ML/MIN/1.73M2

## 2025-02-06 ENCOUNTER — HOSPITAL ENCOUNTER (OUTPATIENT)
Dept: MRI IMAGING | Age: 50
Discharge: HOME | End: 2025-02-08
Payer: COMMERCIAL

## 2025-02-06 DIAGNOSIS — Z80.3 FAMILY HISTORY OF MALIGNANT NEOPLASM OF FEMALE BREAST: ICD-10-CM

## 2025-02-06 DIAGNOSIS — R92.30 DENSE BREAST: ICD-10-CM

## 2025-02-06 DIAGNOSIS — Z91.89 AT HIGH RISK FOR BREAST CANCER: ICD-10-CM

## 2025-02-06 PROCEDURE — A9585 GADOBUTROL INJECTION: HCPCS | Performed by: RADIOLOGY

## 2025-02-06 PROCEDURE — C8908 MRI W/O FOL W/CONT, BREAST,: HCPCS

## 2025-02-06 PROCEDURE — 6360000004 HC RX CONTRAST MEDICATION: Performed by: RADIOLOGY

## 2025-02-06 RX ORDER — GADOBUTROL 604.72 MG/ML
7 INJECTION INTRAVENOUS
Status: COMPLETED | OUTPATIENT
Start: 2025-02-06 | End: 2025-02-06

## 2025-02-06 RX ADMIN — GADOBUTROL 7 ML: 604.72 INJECTION INTRAVENOUS at 10:23

## 2025-02-10 DIAGNOSIS — R92.8 OTHER ABNORMAL AND INCONCLUSIVE FINDINGS ON DIAGNOSTIC IMAGING OF BREAST: Primary | ICD-10-CM

## 2025-02-10 NOTE — PROGRESS NOTES
I called Marlin with her MRI results.  Multiple areas of the right breast were recommended  US follow up  Will check limited right breast US and have her return to the clinic for follow up with one of my physician practice partners.    Magda Castillo RN (Terrie), MSN, APRN-CNP, AOCNP  Advanced Oncology Certified Nurse Practitioner  [facility]-Department of Breast Surgery   Office Phone [phone]; Fax [phone]  Located within the [facility] Breast [facility]

## 2025-02-10 NOTE — PROGRESS NOTES
RN took order to [facility] schedulers to call patient and schedule.     Electronically signed by Odessa Damon RN on 2/10/25 at 8:31 AM EST

## 2025-02-11 ENCOUNTER — TELEPHONE (OUTPATIENT)
Dept: GENERAL RADIOLOGY | Age: 50
End: 2025-02-11

## 2025-02-11 NOTE — TELEPHONE ENCOUNTER
Call to patient in reference to her MRI performed at [facility] on February 7, 2025.  Instructed patient that the radiologist has recommended some additional breast imaging, in order to make a final determination/result. A  from [facility] will contact her to schedule the additional imaging study/studies. Verbalizes understanding and is agreeable to proceed.

## 2025-02-18 SDOH — ECONOMIC STABILITY: INCOME INSECURITY: IN THE LAST 12 MONTHS, WAS THERE A TIME WHEN YOU WERE NOT ABLE TO PAY THE MORTGAGE OR RENT ON TIME?: NO

## 2025-02-18 SDOH — ECONOMIC STABILITY: FOOD INSECURITY: WITHIN THE PAST 12 MONTHS, YOU WORRIED THAT YOUR FOOD WOULD RUN OUT BEFORE YOU GOT MONEY TO BUY MORE.: NEVER TRUE

## 2025-02-18 SDOH — ECONOMIC STABILITY: FOOD INSECURITY: WITHIN THE PAST 12 MONTHS, THE FOOD YOU BOUGHT JUST DIDN'T LAST AND YOU DIDN'T HAVE MONEY TO GET MORE.: NEVER TRUE

## 2025-02-18 SDOH — ECONOMIC STABILITY: TRANSPORTATION INSECURITY
IN THE PAST 12 MONTHS, HAS LACK OF TRANSPORTATION KEPT YOU FROM MEETINGS, WORK, OR FROM GETTING THINGS NEEDED FOR DAILY LIVING?: NO

## 2025-02-18 ASSESSMENT — PATIENT HEALTH QUESTIONNAIRE - PHQ9
SUM OF ALL RESPONSES TO PHQ9 QUESTIONS 1 & 2: 0
2. FEELING DOWN, DEPRESSED OR HOPELESS: NOT AT ALL
SUM OF ALL RESPONSES TO PHQ QUESTIONS 1-9: 0
SUM OF ALL RESPONSES TO PHQ9 QUESTIONS 1 & 2: 0
SUM OF ALL RESPONSES TO PHQ QUESTIONS 1-9: 0
1. LITTLE INTEREST OR PLEASURE IN DOING THINGS: NOT AT ALL
SUM OF ALL RESPONSES TO PHQ QUESTIONS 1-9: 0
2. FEELING DOWN, DEPRESSED OR HOPELESS: NOT AT ALL
1. LITTLE INTEREST OR PLEASURE IN DOING THINGS: NOT AT ALL
SUM OF ALL RESPONSES TO PHQ QUESTIONS 1-9: 0

## 2025-02-20 ENCOUNTER — HOSPITAL ENCOUNTER (OUTPATIENT)
Dept: GENERAL RADIOLOGY | Age: 50
Discharge: HOME | End: 2025-02-22
Payer: COMMERCIAL

## 2025-02-20 DIAGNOSIS — R92.8 OTHER ABNORMAL AND INCONCLUSIVE FINDINGS ON DIAGNOSTIC IMAGING OF BREAST: ICD-10-CM

## 2025-02-20 PROCEDURE — 76642 ULTRASOUND BREAST LIMITED: CPT

## 2025-02-21 ENCOUNTER — PATIENT MESSAGE (OUTPATIENT)
Dept: BREAST CENTER | Age: 50
End: 2025-02-21

## 2025-02-21 ENCOUNTER — TELEPHONE (OUTPATIENT)
Dept: BREAST CENTER | Age: 50
End: 2025-02-21

## 2025-02-21 ENCOUNTER — OFFICE VISIT (OUTPATIENT)
Dept: FAMILY MEDICINE CLINIC | Age: 50
End: 2025-02-21

## 2025-02-21 VITALS
WEIGHT: 153.4 LBS | SYSTOLIC BLOOD PRESSURE: 102 MMHG | DIASTOLIC BLOOD PRESSURE: 66 MMHG | BODY MASS INDEX: 23.67 KG/M2 | OXYGEN SATURATION: 98 % | HEART RATE: 80 BPM | TEMPERATURE: 97.4 F

## 2025-02-21 DIAGNOSIS — F41.9 ANXIETY: ICD-10-CM

## 2025-02-21 DIAGNOSIS — Z01.84 IMMUNITY STATUS TESTING: ICD-10-CM

## 2025-02-21 DIAGNOSIS — Z91.030 ALLERGY TO HONEY BEE VENOM: ICD-10-CM

## 2025-02-21 DIAGNOSIS — E03.9 ACQUIRED HYPOTHYROIDISM: ICD-10-CM

## 2025-02-21 DIAGNOSIS — Z76.0 MEDICATION REFILL: ICD-10-CM

## 2025-02-21 DIAGNOSIS — R92.8 OTHER ABNORMAL AND INCONCLUSIVE FINDINGS ON DIAGNOSTIC IMAGING OF BREAST: ICD-10-CM

## 2025-02-21 DIAGNOSIS — Z00.00 ENCOUNTER FOR PREVENTIVE CARE: ICD-10-CM

## 2025-02-21 DIAGNOSIS — Z01.818 PRE-OP TESTING: Primary | ICD-10-CM

## 2025-02-21 DIAGNOSIS — E04.1 THYROID NODULE: ICD-10-CM

## 2025-02-21 DIAGNOSIS — E55.9 VITAMIN D DEFICIENCY, UNSPECIFIED: ICD-10-CM

## 2025-02-21 DIAGNOSIS — J30.81 CAT ALLERGIES: ICD-10-CM

## 2025-02-21 DIAGNOSIS — E78.5 HYPERLIPIDEMIA, UNSPECIFIED HYPERLIPIDEMIA TYPE: Primary | ICD-10-CM

## 2025-02-21 RX ORDER — EPINEPHRINE 0.3 MG/.3ML
0.3 INJECTION SUBCUTANEOUS ONCE
Qty: 0.3 ML | Refills: 1 | Status: SHIPPED | OUTPATIENT
Start: 2025-02-21 | End: 2025-02-21

## 2025-02-21 RX ORDER — LEVOTHYROXINE SODIUM 50 UG/1
50 TABLET ORAL DAILY
Qty: 90 TABLET | Refills: 1 | Status: SHIPPED | OUTPATIENT
Start: 2025-02-21

## 2025-02-21 RX ORDER — ALBUTEROL SULFATE 90 UG/1
2 INHALANT RESPIRATORY (INHALATION) EVERY 4 HOURS PRN
Qty: 1 EACH | Refills: 5 | Status: SHIPPED | OUTPATIENT
Start: 2025-02-21

## 2025-02-21 RX ORDER — ESCITALOPRAM OXALATE 5 MG/1
5 TABLET ORAL DAILY
Qty: 90 TABLET | Refills: 1 | Status: SHIPPED | OUTPATIENT
Start: 2025-02-21

## 2025-02-21 NOTE — PROGRESS NOTES
CC: Marlin Boswell is a 50 y.o. yo female is here for evaluation evaluation for the following chronic medical concerns: Anxiety, Hypothyroidism, and Medication Check (4 month medication check)      HPI:      Anxiety / depression; follows with Sherita Robles; about every 4-6 weeks; symptom previously exacerbated by recent passing of father and stressors from; now on lexapro 5mg and doing wel    HLD: ASCVD 0.7% not on medical therapy  Mild intermittent asthma - exacerbated by animal dander; uses albuterol PRN -- same  Hashimoto hypothyroidism:  synthroid now on 50mcg   Benign thryoid nodule in the past - US done at [facility]; no records on this  Mild leukopenia  Bee venom allergy; worsening reaction / swelling after bee stings; at this time no anaphylaxis but has been getting worsening reaction with subsequent stings -- same  Preventive: Patient has a Tyrer-Cuzick score of: 21.7%; Scattered fibroglandular density; obtains mammo / pap through [facility]; Dr. Interiano; Referral to TM and planning alternating MRI; ** did complete MRI breast 2/2025 with recommendation for R limited US which was completed 2/20/25, probably benign; planning repeat MRI 8/2025; order in by TM; last mammo 4/2024      Vitals:   /66 (Site: Right Upper Arm, Position: Sitting, Cuff Size: Medium Adult)   Pulse 80   Temp 97.4 °F (36.3 °C)   Wt 69.6 kg (153 lb 6.4 oz)   LMP  (LMP Unknown)   SpO2 98%   BMI 23.67 kg/m²   Wt Readings from Last 3 Encounters:   02/21/25 69.6 kg (153 lb 6.4 oz)   10/18/24 69.1 kg (152 lb 6.4 oz)   06/21/24 69.6 kg (153 lb 6.4 oz)       PE:  Constitutional - alert, well appearing, and in no distress  Eyes - extraocular eye movements intact, left eye normal, right eye normal, no conjunctivitis noted  Neck - symmetric, no obvious masses noted  Respiratory- clear to auscultation, no wheezes, rales or rhonchi, symmetric air entry; no increased work of breathing  Cardiovascular - normal rate, regular rhythm,

## 2025-02-21 NOTE — TELEPHONE ENCOUNTER
I left a  for Marlin to review her MRI bilateral breast results:    02/07/2025 MRI bilateral breast:  IMPRESSION:  Right breast: Rounded area of mass-like signal enhancement above background  right breast at approximately 2 o'clock with second-look ultrasound advised.  Subareolar tissue show evidence of some mild enhancement above background and  should be evaluated with ultrasound as well.  An ovoid area of contrast  enhancement at 2-3 o'clock 5-6 cm from the nipple approximately 17 mm in  length is seen.  This contains a biopsy marker indicative of prior sampling.  However, additional ultrasound evaluation is advised.  Left breast: No MRI evidence of malignancy left breast.  BI-RADS 0      02/20/2025 Follow up limited right breast US:  IMPRESSION:  1.  No definite sonographic correlate for the previously described  enhancement within the right breast seen on recent breast MRI.  These areas  of enhancement are probably benign, and follow-up bilateral breast MRI with  contrast is recommended in 6 months.     BIRADS:  BIRADS - CATEGORY 3    Will await return call.    Magda Castillo RN (Terrie), MSN, APRN-CNP, AOCNP  Advanced Oncology Certified Nurse Practitioner  [facility]-Department of Breast Surgery   Office Phone [phone]; Fax [phone]  Located within the [facility] Breast [facility]

## 2025-02-21 NOTE — PROGRESS NOTES
Orders placed for repeat MRI of the bilateral breast in 6 months.  Will await return call.  Recommend in the interim, that she return to review MRI results and have further discussion with Dr. Boyle.    Await return call from Marlin Castillo (Terrie) RN, MSN, APRN-CNP, AOCNP  Advanced Oncology Certified Nurse Practitioner  [facility]-Department of Breast Surgery   Office Phone [phone]; Fax [phone]  Located within the [facility] Breast [facility]

## 2025-02-21 NOTE — TELEPHONE ENCOUNTER
I spoke to Marlin regarding her MRI of the bilateral breast results with subsequent right breast ultrasound.  Imaging was assigned a BI-RADS 3 for 2 areas of enhancement in the right breast.  We reviewed the Breast Imaging and Reporting System (BI-RADS) was established by the American College of Radiology to help classify findings on mammography. Findings are classified based on the risk of breast cancer, with a BI-RADS 2 lesion being benign, or not cancerous, to a BI-RADS 6 representing a lesion that is biopsy-proven to be malignant.    The finding reported on her imaging was a BI-RADS 3, a probably benign finding.  This assessment means that the lesion is one of a few specific findings that has been shown to have less than a 2 percent chance of being cancer and that any cancer present is not likely to spread in the time frame recommended for follow-up imaging.  We discussed options including:   We could proceed with MRI guided right breast biopsy.    I offered referral to one of my my physician practice partners or to a provider of her choice for evaluation and recommendations.    Active surveillance with follow-up in 6 months with imaging as recommended per radiology followed by a clinical exam.    Advantages and disadvantages of each option discussed at length.  She opted to proceed with consultation with one of my physician practice partners for further evaluation and recommendations which I completely support.  She will continue breast self-examination monthly and bring any changes to our attention.  Questions answered to patient's satisfaction.    Magda Castillo (Terrie), RN, MSN, APRN-CNP, AOCNP  Advanced Oncology Certified Nurse Practitioner  [facility]-Department of Breast Surgery   Office Phone [phone]; Fax [phone]  Located within the [facility]

## 2025-02-24 ENCOUNTER — TELEPHONE (OUTPATIENT)
Dept: BREAST CENTER | Age: 50
End: 2025-02-24

## 2025-02-24 NOTE — TELEPHONE ENCOUNTER
AYDIN Damon contacted patient to schedule an appointment for follow up. RN scheduled pt for 3/10/25 @ 3pm with . Patient verbalized appointment date, time and location. RN verified number that was good to do reminder call was the one listed in chart. RN advised patient where to park and enter building for appointment.      Sooner appointments were offered but wasn't working with patients schedule.       Electronically signed by Odessa Damon RN on 2/24/25 at 9:41 AM EST

## 2025-02-24 NOTE — TELEPHONE ENCOUNTER
----- Message from CARLOS Murrieta CNP sent at 2/21/2025 12:07 PM EST -----  Please schedule Marlin an office visit with Dr. Boyle for review of a recent abnormal breast MRI-BI-RADS 3

## 2025-03-10 ENCOUNTER — OFFICE VISIT (OUTPATIENT)
Dept: BREAST CENTER | Age: 50
End: 2025-03-10
Payer: COMMERCIAL

## 2025-03-10 VITALS
DIASTOLIC BLOOD PRESSURE: 70 MMHG | WEIGHT: 154 LBS | SYSTOLIC BLOOD PRESSURE: 118 MMHG | HEART RATE: 78 BPM | TEMPERATURE: 98.1 F | OXYGEN SATURATION: 100 % | BODY MASS INDEX: 24.17 KG/M2 | RESPIRATION RATE: 16 BRPM | HEIGHT: 67 IN

## 2025-03-10 DIAGNOSIS — R92.8 ABNORMAL MRI, BREAST: Primary | ICD-10-CM

## 2025-03-10 DIAGNOSIS — Z12.31 ENCOUNTER FOR SCREENING MAMMOGRAM FOR BREAST CANCER: ICD-10-CM

## 2025-03-10 PROCEDURE — G8427 DOCREV CUR MEDS BY ELIG CLIN: HCPCS | Performed by: SURGERY

## 2025-03-10 PROCEDURE — 99213 OFFICE O/P EST LOW 20 MIN: CPT | Performed by: SURGERY

## 2025-03-10 PROCEDURE — 1036F TOBACCO NON-USER: CPT | Performed by: SURGERY

## 2025-03-10 PROCEDURE — 99203 OFFICE O/P NEW LOW 30 MIN: CPT | Performed by: SURGERY

## 2025-03-10 PROCEDURE — 3017F COLORECTAL CA SCREEN DOC REV: CPT | Performed by: SURGERY

## 2025-03-10 PROCEDURE — G8420 CALC BMI NORM PARAMETERS: HCPCS | Performed by: SURGERY

## 2025-03-10 NOTE — PATIENT INSTRUCTIONS
Office will call in June to discuss moving forward with repeat breast MRI.     Any questions or concerns, please contact the office at [phone].

## 2025-03-10 NOTE — PROGRESS NOTES
Date of visit: 3/10/2025    04/04/24: TM  03/10/25      DIAGNOSIS:  1. (03/10/25) Abnormal breast MRI  2. Higher risk for breast cancer  * TC 2.4/22  3. Family history of breast cancer  * Paternal aunt  4. History of RIGHT breast biopsy    IMAGING/PROCEDURES:  1. (04/04/24) BILATERAL st-mammogram: BIRADS-1  2. (02/06/25) MRI: BIRADS-0  * RIGHT (2:00-3) area of enhancement  * RIGHT (2:00-5) ovoid enhancement with biopsy clip  3. (02/20/25) RIGHT US: BIRADS-3  * No US findings  * recommend 6 month MRI      Breast History  Marlin Boswell was in the office for follow-up.    Marlin was evaluated last year in our wrist clinic.  She underwent a mammogram at that time and it was recommended that she undergo an MRI for a Tyrer-Cuzick score lifetime of 22.  The patient had the MRI and February of this year.  There were 2 areas of enhancement in the right breast.  There were no findings on the ultrasound.  It was felt that if 6-month follow-up MRI would be in order.    The patient is in the office now to discuss the above and receive any additional recommendations.    Breast Symptoms  Marlin reports no symptoms.    Breast Examination  A comprehensive breast examination demonstrates no concerns in either breast or axilla.    Breast Imaging  I did review the recent MRI and ultrasound as well as the mammogram for 2024.  The key findings on the MRI are 2 areas of enhancement in the in the right breast.  One of them is associated with a prior benign biopsy.  The other was some more anterior.  In attempting to correlate this with the mammogram I do believe there is a mammographic correlate which also appears benign at this time.    Assessment/Plan  Marlin Boswell was in the office for her consultation regarding her recent abnormal breast MRI.    I informed Marlin that I do believe she needs the follow-up study and an order has been placed.  It is also notable that she is due for screening mammogram in April.    I informed

## 2025-04-11 ENCOUNTER — HOSPITAL ENCOUNTER (OUTPATIENT)
Dept: GENERAL RADIOLOGY | Age: 50
Discharge: HOME | End: 2025-04-13
Payer: COMMERCIAL

## 2025-04-11 VITALS — HEIGHT: 68 IN | BODY MASS INDEX: 21.98 KG/M2 | WEIGHT: 145 LBS

## 2025-04-11 DIAGNOSIS — Z12.31 ENCOUNTER FOR SCREENING MAMMOGRAM FOR BREAST CANCER: ICD-10-CM

## 2025-04-11 PROCEDURE — 77063 BREAST TOMOSYNTHESIS BI: CPT

## 2025-04-16 ENCOUNTER — RESULTS FOLLOW-UP (OUTPATIENT)
Dept: BREAST CENTER | Age: 50
End: 2025-04-16

## 2025-07-17 DIAGNOSIS — E03.9 ACQUIRED HYPOTHYROIDISM: ICD-10-CM

## 2025-07-17 DIAGNOSIS — E78.5 HYPERLIPIDEMIA, UNSPECIFIED HYPERLIPIDEMIA TYPE: ICD-10-CM

## 2025-07-17 DIAGNOSIS — E55.9 VITAMIN D DEFICIENCY, UNSPECIFIED: ICD-10-CM

## 2025-07-17 DIAGNOSIS — Z01.84 IMMUNITY STATUS TESTING: ICD-10-CM

## 2025-07-17 DIAGNOSIS — F41.9 ANXIETY: ICD-10-CM

## 2025-07-17 LAB
ALBUMIN: 4.3 G/DL (ref 3.5–5.2)
ALP BLD-CCNC: 89 U/L (ref 35–104)
ALT SERPL-CCNC: 12 U/L (ref 0–35)
ANION GAP SERPL CALCULATED.3IONS-SCNC: 12 MMOL/L (ref 7–16)
AST SERPL-CCNC: 24 U/L (ref 0–35)
BASOPHILS ABSOLUTE: 0.05 K/UL (ref 0–0.2)
BASOPHILS RELATIVE PERCENT: 1 % (ref 0–2)
BILIRUB SERPL-MCNC: 0.4 MG/DL (ref 0–1.2)
BUN BLDV-MCNC: 13 MG/DL (ref 6–20)
CALCIUM SERPL-MCNC: 9.7 MG/DL (ref 8.6–10)
CHLORIDE BLD-SCNC: 101 MMOL/L (ref 98–107)
CHOLESTEROL, TOTAL: 233 MG/DL
CO2: 26 MMOL/L (ref 22–29)
CREAT SERPL-MCNC: 0.8 MG/DL (ref 0.5–1)
EOSINOPHILS ABSOLUTE: 0.16 K/UL (ref 0.05–0.5)
EOSINOPHILS RELATIVE PERCENT: 5 % (ref 0–6)
GFR, ESTIMATED: >90 ML/MIN/1.73M2
GLUCOSE BLD-MCNC: 90 MG/DL (ref 74–99)
HBV SURFACE AB TITR SER: >1000 MIU/ML (ref 0–9.99)
HCT VFR BLD CALC: 37.3 % (ref 34–48)
HDLC SERPL-MCNC: 73 MG/DL
HEMOGLOBIN: 12.2 G/DL (ref 11.5–15.5)
HEP B S AGB SURF AG: NONREACTIVE
IMMATURE GRANULOCYTES %: 0 % (ref 0–5)
IMMATURE GRANULOCYTES ABSOLUTE: <0.03 K/UL (ref 0–0.58)
LDL CHOLESTEROL: 144 MG/DL
LYMPHOCYTES ABSOLUTE: 1.57 K/UL (ref 1.5–4)
LYMPHOCYTES RELATIVE PERCENT: 44 % (ref 20–42)
MCH RBC QN AUTO: 30.7 PG (ref 26–35)
MCHC RBC AUTO-ENTMCNC: 32.7 G/DL (ref 32–34.5)
MCV RBC AUTO: 93.7 FL (ref 80–99.9)
MONOCYTES ABSOLUTE: 0.37 K/UL (ref 0.1–0.95)
MONOCYTES RELATIVE PERCENT: 10 % (ref 2–12)
NEUTROPHILS ABSOLUTE: 1.4 K/UL (ref 1.8–7.3)
NEUTROPHILS RELATIVE PERCENT: 40 % (ref 43–80)
PDW BLD-RTO: 13.7 % (ref 11.5–15)
PLATELET # BLD: 246 K/UL (ref 130–450)
PMV BLD AUTO: 10.9 FL (ref 7–12)
POTASSIUM SERPL-SCNC: 4 MMOL/L (ref 3.5–5.1)
RBC # BLD: 3.98 M/UL (ref 3.5–5.5)
SODIUM BLD-SCNC: 140 MMOL/L (ref 136–145)
T4 FREE: 1.2 NG/DL (ref 0.9–1.7)
TOTAL PROTEIN: 7.2 G/DL (ref 6.4–8.3)
TRIGL SERPL-MCNC: 80 MG/DL
TSH SERPL DL<=0.05 MIU/L-ACNC: 2.63 UIU/ML (ref 0.27–4.2)
VITAMIN D 25-HYDROXY: 31 NG/ML (ref 30–100)
VLDLC SERPL CALC-MCNC: 16 MG/DL
WBC # BLD: 3.6 K/UL (ref 4.5–11.5)

## 2025-07-19 LAB — HEPATITIS B CORE TOTAL ANTIBODY: NONREACTIVE

## 2025-07-21 ENCOUNTER — TELEPHONE (OUTPATIENT)
Age: 50
End: 2025-07-21

## 2025-07-21 NOTE — TELEPHONE ENCOUNTER
Patient is due for her follow up breast MRI this month. Left patient message with call back number. Will check on all that is needed in order to schedule once patient returns call.

## 2025-07-21 NOTE — TELEPHONE ENCOUNTER
----- Message from AYDIN SHAH RN, BSN sent at 7/21/2025 10:10 AM EDT -----  Hi- Looks like patient is due now for this follow up study.

## 2025-07-22 ENCOUNTER — OFFICE VISIT (OUTPATIENT)
Dept: FAMILY MEDICINE CLINIC | Age: 50
End: 2025-07-22
Payer: COMMERCIAL

## 2025-07-22 VITALS
HEART RATE: 65 BPM | WEIGHT: 156.8 LBS | SYSTOLIC BLOOD PRESSURE: 96 MMHG | BODY MASS INDEX: 23.84 KG/M2 | DIASTOLIC BLOOD PRESSURE: 62 MMHG | OXYGEN SATURATION: 97 % | TEMPERATURE: 98 F

## 2025-07-22 DIAGNOSIS — F41.9 ANXIETY: Primary | ICD-10-CM

## 2025-07-22 DIAGNOSIS — E03.9 ACQUIRED HYPOTHYROIDISM: ICD-10-CM

## 2025-07-22 DIAGNOSIS — Z12.11 SCREEN FOR COLON CANCER: ICD-10-CM

## 2025-07-22 DIAGNOSIS — D72.819 LEUKOPENIA, UNSPECIFIED TYPE: ICD-10-CM

## 2025-07-22 PROCEDURE — 1036F TOBACCO NON-USER: CPT | Performed by: FAMILY MEDICINE

## 2025-07-22 PROCEDURE — 3017F COLORECTAL CA SCREEN DOC REV: CPT | Performed by: FAMILY MEDICINE

## 2025-07-22 PROCEDURE — G8427 DOCREV CUR MEDS BY ELIG CLIN: HCPCS | Performed by: FAMILY MEDICINE

## 2025-07-22 PROCEDURE — G8420 CALC BMI NORM PARAMETERS: HCPCS | Performed by: FAMILY MEDICINE

## 2025-07-22 PROCEDURE — 99214 OFFICE O/P EST MOD 30 MIN: CPT | Performed by: FAMILY MEDICINE

## 2025-07-22 RX ORDER — ESCITALOPRAM OXALATE 5 MG/1
5 TABLET ORAL DAILY
Qty: 90 TABLET | Refills: 1 | Status: SHIPPED | OUTPATIENT
Start: 2025-07-22

## 2025-07-22 RX ORDER — LEVOTHYROXINE SODIUM 50 UG/1
50 TABLET ORAL DAILY
Qty: 90 TABLET | Refills: 1 | Status: SHIPPED | OUTPATIENT
Start: 2025-07-22

## 2025-07-22 NOTE — PROGRESS NOTES
CC: Marlin Boswell is a 50 y.o. yo female is here for evaluation evaluation for the following chronic medical concerns: Anxiety (5-month follow-up) and Hypothyroidism      HPI:      Anxiety / depression; follows with Sherita Robles; about every 4-6 weeks ** same; symptom previously exacerbated by recent passing of father and stressors from; now on lexapro 5mg and doing well - same    HLD: ASCVD 0.7% not on medical therapy  Mild intermittent asthma - exacerbated by animal dander; uses albuterol PRN -- per previous  Hashimoto hypothyroidism:  synthroid now on 50mcg  **  see recent TSH wnl  Benign thryoid nodule in the past - US done at [facility]; no records on this ** planning thyroid US, not scheduled as of yet  Mild leukopenia ** mom with similar issue **  Bee venom allergy; worsening reaction / swelling after bee stings; at this time no anaphylaxis but has been getting worsening reaction with subsequent stings -- per previous  Preventive: Patient has a Tyrer-Cuzick score of: 21.7%; Scattered fibroglandular density; obtains mammo / pap through [facility]; Dr. Interiano; Referral to TM and planning alternating MRI; ** did complete MRI breast 2/2025 with recommendation for R limited US which was completed 2/20/25, probably benign; planning repeat MRI 8/2025; order in by TM; last mammo 4/2024        Vitals:   BP 96/62 (BP Site: Left Upper Arm, Patient Position: Sitting, BP Cuff Size: Medium Adult)   Pulse 65   Temp 98 °F (36.7 °C) (Oral)   Wt 71.1 kg (156 lb 12.8 oz)   LMP  (LMP Unknown)   SpO2 97%   BMI 23.84 kg/m²   Wt Readings from Last 3 Encounters:   07/22/25 71.1 kg (156 lb 12.8 oz)   04/11/25 65.8 kg (145 lb)   03/10/25 69.9 kg (154 lb)         PE:  Constitutional - alert, well appearing, and in no distress  Eyes - extraocular eye movements intact, left eye normal, right eye normal, no conjunctivitis noted  Neck - symmetric, no obvious masses noted  Respiratory- clear to auscultation, no wheezes,

## 2025-07-28 ENCOUNTER — TELEPHONE (OUTPATIENT)
Age: 50
End: 2025-07-28

## 2025-07-28 NOTE — TELEPHONE ENCOUNTER
Second attempt made to contact patient to start the process of scheduling her breast MRI. Left message with call back  number.

## 2025-08-04 ENCOUNTER — TELEPHONE (OUTPATIENT)
Age: 50
End: 2025-08-04

## 2025-08-06 ENCOUNTER — TELEPHONE (OUTPATIENT)
Age: 50
End: 2025-08-06

## 2025-08-06 DIAGNOSIS — F41.8 TEST ANXIETY: Primary | ICD-10-CM

## 2025-08-06 RX ORDER — LORAZEPAM 1 MG/1
TABLET ORAL
Qty: 2 TABLET | Refills: 0 | Status: SHIPPED | OUTPATIENT
Start: 2025-08-06 | End: 2025-09-22

## 2025-08-21 ENCOUNTER — HOSPITAL ENCOUNTER (OUTPATIENT)
Dept: INFUSION THERAPY | Age: 50
Discharge: HOME | End: 2025-08-21

## 2025-08-21 ENCOUNTER — OFFICE VISIT (OUTPATIENT)
Age: 50
End: 2025-08-21
Payer: COMMERCIAL

## 2025-08-21 VITALS
BODY MASS INDEX: 23.34 KG/M2 | WEIGHT: 154 LBS | HEIGHT: 68 IN | HEART RATE: 71 BPM | DIASTOLIC BLOOD PRESSURE: 97 MMHG | TEMPERATURE: 97.8 F | OXYGEN SATURATION: 100 % | SYSTOLIC BLOOD PRESSURE: 110 MMHG

## 2025-08-21 DIAGNOSIS — D70.0 CHRONIC FAMILIAL NEUTROPENIA (HCC): Primary | ICD-10-CM

## 2025-08-21 DIAGNOSIS — E06.3 HASHIMOTO'S THYROIDITIS: ICD-10-CM

## 2025-08-21 PROCEDURE — G8427 DOCREV CUR MEDS BY ELIG CLIN: HCPCS | Performed by: INTERNAL MEDICINE

## 2025-08-21 PROCEDURE — 3017F COLORECTAL CA SCREEN DOC REV: CPT | Performed by: INTERNAL MEDICINE

## 2025-08-21 PROCEDURE — 99204 OFFICE O/P NEW MOD 45 MIN: CPT | Performed by: INTERNAL MEDICINE

## 2025-08-21 PROCEDURE — G8420 CALC BMI NORM PARAMETERS: HCPCS | Performed by: INTERNAL MEDICINE

## 2025-08-21 PROCEDURE — 1036F TOBACCO NON-USER: CPT | Performed by: INTERNAL MEDICINE

## 2025-08-22 ENCOUNTER — OFFICE VISIT (OUTPATIENT)
Dept: SURGERY | Age: 50
End: 2025-08-22

## 2025-08-22 VITALS
SYSTOLIC BLOOD PRESSURE: 92 MMHG | WEIGHT: 158 LBS | BODY MASS INDEX: 23.95 KG/M2 | TEMPERATURE: 97.9 F | HEIGHT: 68 IN | OXYGEN SATURATION: 98 % | HEART RATE: 80 BPM | DIASTOLIC BLOOD PRESSURE: 66 MMHG | RESPIRATION RATE: 18 BRPM

## 2025-08-22 DIAGNOSIS — K62.5 BRBPR (BRIGHT RED BLOOD PER RECTUM): ICD-10-CM

## 2025-08-22 DIAGNOSIS — Z83.719 FAMILY HISTORY OF COLONIC POLYPS: ICD-10-CM

## 2025-08-22 DIAGNOSIS — Z12.11 ENCOUNTER FOR SCREENING COLONOSCOPY: Primary | ICD-10-CM

## 2025-08-22 RX ORDER — SODIUM CHLORIDE 0.9 % (FLUSH) 0.9 %
5-40 SYRINGE (ML) INJECTION PRN
OUTPATIENT
Start: 2025-08-22

## 2025-08-22 RX ORDER — SODIUM CHLORIDE 9 MG/ML
INJECTION, SOLUTION INTRAVENOUS PRN
OUTPATIENT
Start: 2025-08-22

## 2025-08-22 RX ORDER — SODIUM CHLORIDE 0.9 % (FLUSH) 0.9 %
5-40 SYRINGE (ML) INJECTION EVERY 12 HOURS SCHEDULED
OUTPATIENT
Start: 2025-08-22

## 2025-08-25 ENCOUNTER — TELEPHONE (OUTPATIENT)
Dept: SURGERY | Age: 50
End: 2025-08-25

## 2025-08-29 DIAGNOSIS — Z01.818 PRE-OP TESTING: ICD-10-CM

## 2025-08-29 LAB
BUN BLDV-MCNC: 16 MG/DL (ref 6–20)
CREAT SERPL-MCNC: 0.7 MG/DL (ref 0.5–1)
GFR, ESTIMATED: >90 ML/MIN/1.73M2

## 2025-09-04 ENCOUNTER — HOSPITAL ENCOUNTER (OUTPATIENT)
Dept: MRI IMAGING | Age: 50
Discharge: HOME | End: 2025-09-06
Payer: COMMERCIAL

## 2025-09-04 DIAGNOSIS — R92.8 OTHER ABNORMAL AND INCONCLUSIVE FINDINGS ON DIAGNOSTIC IMAGING OF BREAST: ICD-10-CM

## 2025-09-04 PROCEDURE — C8908 MRI W/O FOL W/CONT, BREAST,: HCPCS

## 2025-09-04 PROCEDURE — A9585 GADOBUTROL INJECTION: HCPCS | Performed by: RADIOLOGY

## 2025-09-04 PROCEDURE — 6360000004 HC RX CONTRAST MEDICATION: Performed by: RADIOLOGY

## 2025-09-04 RX ORDER — GADOBUTROL 604.72 MG/ML
7 INJECTION INTRAVENOUS
Status: COMPLETED | OUTPATIENT
Start: 2025-09-04 | End: 2025-09-04

## 2025-09-04 RX ADMIN — GADOBUTROL 7 ML: 604.72 INJECTION INTRAVENOUS at 12:39
